# Patient Record
Sex: MALE | Race: OTHER | HISPANIC OR LATINO | URBAN - METROPOLITAN AREA
[De-identification: names, ages, dates, MRNs, and addresses within clinical notes are randomized per-mention and may not be internally consistent; named-entity substitution may affect disease eponyms.]

---

## 2023-03-05 ENCOUNTER — EMERGENCY (EMERGENCY)
Facility: HOSPITAL | Age: 68
LOS: 1 days | Discharge: HOME | End: 2023-03-05
Admitting: EMERGENCY MEDICINE
Payer: MEDICARE

## 2023-03-05 VITALS
OXYGEN SATURATION: 97 % | HEART RATE: 85 BPM | HEIGHT: 66 IN | DIASTOLIC BLOOD PRESSURE: 74 MMHG | RESPIRATION RATE: 18 BRPM | SYSTOLIC BLOOD PRESSURE: 165 MMHG | TEMPERATURE: 98 F | WEIGHT: 160.06 LBS

## 2023-03-05 PROCEDURE — 96372 THER/PROPH/DIAG INJ SC/IM: CPT

## 2023-03-05 PROCEDURE — 99284 EMERGENCY DEPT VISIT MOD MDM: CPT

## 2023-03-05 PROCEDURE — 99283 EMERGENCY DEPT VISIT LOW MDM: CPT

## 2023-03-05 RX ORDER — DICLOFENAC SODIUM 75 MG/1
1 TABLET, DELAYED RELEASE ORAL
Qty: 15 | Refills: 0
Start: 2023-03-05 | End: 2023-03-09

## 2023-03-05 RX ORDER — LIDOCAINE 4 G/100G
1 CREAM TOPICAL ONCE
Refills: 0 | Status: COMPLETED | OUTPATIENT
Start: 2023-03-05 | End: 2023-03-05

## 2023-03-05 RX ORDER — CYCLOBENZAPRINE HYDROCHLORIDE 10 MG/1
10 TABLET, FILM COATED ORAL ONCE
Refills: 0 | Status: COMPLETED | OUTPATIENT
Start: 2023-03-05 | End: 2023-03-05

## 2023-03-05 RX ORDER — LIDOCAINE 4 G/100G
1 CREAM TOPICAL
Qty: 7 | Refills: 0
Start: 2023-03-05 | End: 2023-03-11

## 2023-03-05 RX ORDER — CYCLOBENZAPRINE HYDROCHLORIDE 10 MG/1
1 TABLET, FILM COATED ORAL
Qty: 15 | Refills: 0
Start: 2023-03-05 | End: 2023-03-09

## 2023-03-05 RX ORDER — KETOROLAC TROMETHAMINE 30 MG/ML
30 SYRINGE (ML) INJECTION ONCE
Refills: 0 | Status: DISCONTINUED | OUTPATIENT
Start: 2023-03-05 | End: 2023-03-05

## 2023-03-05 RX ADMIN — CYCLOBENZAPRINE HYDROCHLORIDE 10 MILLIGRAM(S): 10 TABLET, FILM COATED ORAL at 09:34

## 2023-03-05 RX ADMIN — LIDOCAINE 1 PATCH: 4 CREAM TOPICAL at 09:33

## 2023-03-05 RX ADMIN — Medication 30 MILLIGRAM(S): at 09:33

## 2023-03-05 NOTE — ED ADULT TRIAGE NOTE - CHIEF COMPLAINT QUOTE
pt  [place], c/o right lower back pain, radiating to the right buttocks area and RLE x 1 month. pt stated " its like a pinch that doesn't go away" took  ibuprofen at 5 am with no relief.

## 2023-03-05 NOTE — ED PROVIDER NOTE - NSFOLLOWUPINSTRUCTIONS_ED_ALL_ED_FT
Sciatica  Sciatica is pain, weakness, tingling, or loss of feeling (numbness) along the sciatic nerve. The sciatic nerve starts in the lower back and goes down the back of each leg. Sciatica usually goes away on its own or with treatment. Sometimes, sciatica may come back (recur).  What are the causes?  This condition happens when the sciatic nerve is pinched or has pressure put on it. This may be the result of:  •A disk in between the bones of the spine bulging out too far (herniated disk).  •Changes in the spinal disks that occur with aging.  •A condition that affects a muscle in the butt.  •Extra bone growth near the sciatic nerve.  •A break (fracture) of the area between your hip bones (pelvis).  •Pregnancy.   •Tumor. This is rare.  What increases the risk?  You are more likely to develop this condition if you:  •Play sports that put pressure or stress on the spine.  •Have poor strength and ease of movement (flexibility).  •Have had a back injury in the past.  •Have had back surgery.  •Sit for long periods of time.  •Do activities that involve bending or lifting over and over again.  •Are very overweight (obese).  What are the signs or symptoms?  Symptoms can vary from mild to very bad. They may include:•Any of these problems in the lower back, leg, hip, or butt:  •Mild tingling, loss of feeling, or dull aches.  •Burning sensations.  •Sharp pains.   •Loss of feeling in the back of the calf or the sole of the foot.  •Leg weakness.   •Very bad back pain that makes it hard to move.  These symptoms may get worse when you cough, sneeze, or laugh. They may also get worse when you sit or stand for long periods of time.  How is this treated?  This condition often gets better without any treatment. However, treatment may include:  •Changing or cutting back on physical activity when you have pain.  •Doing exercises and stretching.  •Putting ice or heat on the affected area.  •Medicines that help:   •To relieve pain and swelling.  •To relax your muscles.   •Shots (injections) of medicines that help to relieve pain, irritation, and swelling.  •Surgery.  Follow these instructions at home:  Medicines   •Take over-the-counter and prescription medicines only as told by your doctor.  •Ask your doctor if the medicine prescribed to you:  •Requires you to avoid driving or using heavy machinery.  •Can cause trouble pooping (constipation). You may need to take these steps to prevent or treat trouble pooping:  •Drink enough fluids to keep your pee (urine) pale yellow.  •Take over-the-counter or prescription medicines.  •Eat foods that are high in fiber. These include beans, whole grains, and fresh fruits and vegetables.  •Limit foods that are high in fat and sugar. These include fried or sweet foods.  Managing pain   •If told, put ice on the affected area.  •Put ice in a plastic bag.  •Place a towel between your skin and the bag.  •Leave the ice on for 20 minutes, 2–3 times a day.  •If told, put heat on the affected area. Use the heat source that your doctor tells you to use, such as a moist heat pack or a heating pad.  •Place a towel between your skin and the heat source.  •Leave the heat on for 20–30 minutes.  •Remove the heat if your skin turns bright red. This is very important if you are unable to feel pain, heat, or cold. You may have a greater risk of getting burned.  Activity   •Return to your normal activities as told by your doctor. Ask your doctor what activities are safe for you.  •Avoid activities that make your symptoms worse.  •Take short rests during the day.  •When you rest for a long time, do some physical activity or stretching between periods of rest.  •Avoid sitting for a long time without moving. Get up and move around at least one time each hour.  •Exercise and stretch regularly, as told by your doctor.  • Do not lift anything that is heavier than 10 lb (4.5 kg) while you have symptoms of sciatica.  •Avoid lifting heavy things even when you do not have symptoms.  •Avoid lifting heavy things over and over.  •When you lift objects, always lift in a way that is safe for your body. To do this, you should:  •Bend your knees.  •Keep the object close to your body.  •Avoid twisting.  General instructions   •Stay at a healthy weight.  •Wear comfortable shoes that support your feet. Avoid wearing high heels.  •Avoid sleeping on a mattress that is too soft or too hard. You might have less pain if you sleep on a mattress that is firm enough to support your back.  •Keep all follow-up visits as told by your doctor. This is important.  Contact a doctor if:  •You have pain that:  •Wakes you up when you are sleeping.  •Gets worse when you lie down.  •Is worse than the pain you have had in the past.  •Lasts longer than 4 weeks.  •You lose weight without trying.  Get help right away if:  •You cannot control when you pee (urinate) or poop (have a bowel movement).  •You have weakness in any of these areas and it gets worse:  •Lower back.  •The area between your hip bones.  •Butt.  •Legs.  •You have redness or swelling of your back.  •You have a burning feeling when you pee.  Summary  •Sciatica is pain, weakness, tingling, or loss of feeling (numbness) along the sciatic nerve.  •This condition happens when the sciatic nerve is pinched or has pressure put on it.  •Sciatica can cause pain, tingling, or loss of feeling (numbness) in the lower back, legs, hips, and butt.  •Treatment often includes rest, exercise, medicines, and putting ice or heat on the affected area.

## 2023-03-05 NOTE — ED ADULT NURSE NOTE - CHIEF COMPLAINT QUOTE
pt  [place], c/o right lower back pain, radiating to the right buttocks area and RLE x 1 month. pt stated " its like a pinch that doesn't go away" took  ibuprofen at 5 am with no relief.

## 2023-03-05 NOTE — ED ADULT NURSE NOTE - OBJECTIVE STATEMENT
Patient to ED c/o sciatic pain that starts in right buttock and radiates down right leg x 1 month, denies recent trauma/injury. Ambulatory, AAOX4, NAD.

## 2023-03-05 NOTE — ED PROVIDER NOTE - PATIENT PORTAL LINK FT
You can access the FollowMyHealth Patient Portal offered by [facility] by registering at the following website: [email or web address]. By joining [facility]’s FollowMyHealth portal, you will also be able to view your health information using other applications (apps) compatible with our system.

## 2023-03-05 NOTE — ED PROVIDER NOTE - CLINICAL SUMMARY MEDICAL DECISION MAKING FREE TEXT BOX
pt c/o r buttock pain, radiating down r thigh x 1 mon, no acute changes in symptoms today, no falls/trauma - no concern for fx, no concern for cord compression or cauda equina, exam consistent w/sciatica, will tx w/nsaids and muscle relaxants plus lidoderm, to rest and avoid heavy lifting, etc. f/u w/spine for eval and further management, no emergent indication for any imaging or labs at this time, pt understands and agrees w/plan, strict return precautions given

## 2023-03-05 NOTE — ED PROVIDER NOTE - OBJECTIVE STATEMENT
The pt is a 66 y/o M, who presents to ED c/o R buttock pain x 1 mon. Pt states pain starts in R buttock, radiates down R thigh, constant and dull, aggravated w/certain mov and positions, pain is 7/10, took ibuprofen w/o relief - no meds taken today. Denies fall, trauma, numbness or tingling to toes, loss of bowel or bladder control, fevers, chills, n/v/d, abd pain, flank pain.

## 2023-03-05 NOTE — ED PROVIDER NOTE - MUSCULOSKELETAL, MLM
no spinal tend, no discoloration, FROM, + discomfort over R sciatic notch, LE w/FROM b/l, muscle strength 5/5 b/l, good resistance b/l, + straight leg raise on R, normal gait

## 2023-03-07 DIAGNOSIS — M79.18 MYALGIA, OTHER SITE: ICD-10-CM

## 2023-03-07 DIAGNOSIS — M54.31 SCIATICA, RIGHT SIDE: ICD-10-CM

## 2024-09-23 ENCOUNTER — INPATIENT (INPATIENT)
Facility: HOSPITAL | Age: 69
LOS: 4 days | Discharge: HOME | DRG: 377 | End: 2024-09-28
Attending: STUDENT IN AN ORGANIZED HEALTH CARE EDUCATION/TRAINING PROGRAM | Admitting: STUDENT IN AN ORGANIZED HEALTH CARE EDUCATION/TRAINING PROGRAM
Payer: MEDICARE

## 2024-09-23 VITALS
RESPIRATION RATE: 16 BRPM | SYSTOLIC BLOOD PRESSURE: 172 MMHG | WEIGHT: 149.91 LBS | TEMPERATURE: 98 F | HEIGHT: 66 IN | OXYGEN SATURATION: 99 % | HEART RATE: 79 BPM | DIASTOLIC BLOOD PRESSURE: 57 MMHG

## 2024-09-23 DIAGNOSIS — Z29.9 ENCOUNTER FOR PROPHYLACTIC MEASURES, UNSPECIFIED: ICD-10-CM

## 2024-09-23 DIAGNOSIS — D64.9 ANEMIA, UNSPECIFIED: ICD-10-CM

## 2024-09-23 DIAGNOSIS — K83.8 OTHER SPECIFIED DISEASES OF BILIARY TRACT: ICD-10-CM

## 2024-09-23 DIAGNOSIS — J96.01 ACUTE RESPIRATORY FAILURE WITH HYPOXIA: ICD-10-CM

## 2024-09-23 DIAGNOSIS — E11.9 TYPE 2 DIABETES MELLITUS WITHOUT COMPLICATIONS: ICD-10-CM

## 2024-09-23 DIAGNOSIS — F11.20 OPIOID DEPENDENCE, UNCOMPLICATED: ICD-10-CM

## 2024-09-23 DIAGNOSIS — I10 ESSENTIAL (PRIMARY) HYPERTENSION: ICD-10-CM

## 2024-09-23 DIAGNOSIS — I50.9 HEART FAILURE, UNSPECIFIED: ICD-10-CM

## 2024-09-23 DIAGNOSIS — N17.9 ACUTE KIDNEY FAILURE, UNSPECIFIED: ICD-10-CM

## 2024-09-23 LAB
ADD ON TEST-SPECIMEN IN LAB: SIGNIFICANT CHANGE UP
AGGLUTINATION: PRESENT — SIGNIFICANT CHANGE UP
ALBUMIN SERPL ELPH-MCNC: 3.9 G/DL — SIGNIFICANT CHANGE UP (ref 3.3–5)
ALP SERPL-CCNC: 93 U/L — SIGNIFICANT CHANGE UP (ref 40–120)
ALT FLD-CCNC: 9 U/L — LOW (ref 10–45)
ANION GAP SERPL CALC-SCNC: 11 MMOL/L — SIGNIFICANT CHANGE UP (ref 5–17)
ANISOCYTOSIS BLD QL: SIGNIFICANT CHANGE UP
AST SERPL-CCNC: 12 U/L — SIGNIFICANT CHANGE UP (ref 10–40)
BASOPHILS # BLD AUTO: 0.08 K/UL — SIGNIFICANT CHANGE UP (ref 0–0.2)
BASOPHILS NFR BLD AUTO: 0.9 % — SIGNIFICANT CHANGE UP (ref 0–2)
BILIRUB SERPL-MCNC: <0.2 MG/DL — SIGNIFICANT CHANGE UP (ref 0.2–1.2)
BLD GP AB SCN SERPL QL: NEGATIVE — SIGNIFICANT CHANGE UP
BUN SERPL-MCNC: 30 MG/DL — HIGH (ref 7–23)
CALCIUM SERPL-MCNC: 9 MG/DL — SIGNIFICANT CHANGE UP (ref 8.4–10.5)
CHLORIDE SERPL-SCNC: 104 MMOL/L — SIGNIFICANT CHANGE UP (ref 96–108)
CO2 SERPL-SCNC: 23 MMOL/L — SIGNIFICANT CHANGE UP (ref 22–31)
CREAT SERPL-MCNC: 1.4 MG/DL — HIGH (ref 0.5–1.3)
DACRYOCYTES BLD QL SMEAR: SLIGHT — SIGNIFICANT CHANGE UP
EGFR: 55 ML/MIN/1.73M2 — LOW
EGFR: 55 ML/MIN/1.73M2 — LOW
EOSINOPHIL # BLD AUTO: 0 K/UL — SIGNIFICANT CHANGE UP (ref 0–0.5)
EOSINOPHIL NFR BLD AUTO: 0 % — SIGNIFICANT CHANGE UP (ref 0–6)
FERRITIN SERPL-MCNC: 8 NG/ML — LOW (ref 30–400)
GIANT PLATELETS BLD QL SMEAR: PRESENT — SIGNIFICANT CHANGE UP
GLUCOSE BLDC GLUCOMTR-MCNC: 193 MG/DL — HIGH (ref 70–99)
GLUCOSE SERPL-MCNC: 160 MG/DL — HIGH (ref 70–99)
HAPTOGLOB SERPL-MCNC: 182 MG/DL — SIGNIFICANT CHANGE UP (ref 34–200)
HCT VFR BLD CALC: 14.1 % — CRITICAL LOW (ref 39–50)
HCT VFR BLD CALC: 21.4 % — LOW (ref 39–50)
HGB BLD-MCNC: 4 G/DL — CRITICAL LOW (ref 13–17)
HGB BLD-MCNC: 6.4 G/DL — CRITICAL LOW (ref 13–17)
HYPOCHROMIA BLD QL: SIGNIFICANT CHANGE UP
IRON SATN MFR SERPL: 2 % — LOW (ref 16–55)
IRON SATN MFR SERPL: 9 UG/DL — LOW (ref 45–165)
LDH SERPL L TO P-CCNC: 272 U/L — HIGH (ref 50–242)
LYMPHOCYTES # BLD AUTO: 0.83 K/UL — LOW (ref 1–3.3)
LYMPHOCYTES # BLD AUTO: 9.5 % — LOW (ref 13–44)
MACROCYTES BLD QL: SLIGHT — SIGNIFICANT CHANGE UP
MANUAL SMEAR VERIFICATION: SIGNIFICANT CHANGE UP
MCHC RBC-ENTMCNC: 20.6 PG — LOW (ref 27–34)
MCHC RBC-ENTMCNC: 22.7 PG — LOW (ref 27–34)
MCHC RBC-ENTMCNC: 28.4 GM/DL — LOW (ref 32–36)
MCHC RBC-ENTMCNC: 29.9 GM/DL — LOW (ref 32–36)
MCV RBC AUTO: 72.7 FL — LOW (ref 80–100)
MCV RBC AUTO: 75.9 FL — LOW (ref 80–100)
MICROCYTES BLD QL: SIGNIFICANT CHANGE UP
MONOCYTES # BLD AUTO: 0.45 K/UL — SIGNIFICANT CHANGE UP (ref 0–0.9)
MONOCYTES NFR BLD AUTO: 5.2 % — SIGNIFICANT CHANGE UP (ref 2–14)
NEUTROPHILS # BLD AUTO: 7.35 K/UL — SIGNIFICANT CHANGE UP (ref 1.8–7.4)
NEUTROPHILS NFR BLD AUTO: 83.5 % — HIGH (ref 43–77)
NEUTS BAND # BLD: 0.9 % — SIGNIFICANT CHANGE UP (ref 0–8)
NEUTS BAND NFR BLD: 0.9 % — SIGNIFICANT CHANGE UP (ref 0–8)
NRBC # BLD: 0 /100 WBCS — SIGNIFICANT CHANGE UP (ref 0–0)
NRBC BLD-RTO: 0 /100 WBCS — SIGNIFICANT CHANGE UP (ref 0–0)
OB PNL STL: POSITIVE
OVALOCYTES BLD QL SMEAR: SLIGHT — SIGNIFICANT CHANGE UP
PLAT MORPH BLD: ABNORMAL
PLATELET # BLD AUTO: 171 K/UL — SIGNIFICANT CHANGE UP (ref 150–400)
PLATELET # BLD AUTO: 181 K/UL — SIGNIFICANT CHANGE UP (ref 150–400)
POIKILOCYTOSIS BLD QL AUTO: SIGNIFICANT CHANGE UP
POLYCHROMASIA BLD QL SMEAR: SLIGHT — SIGNIFICANT CHANGE UP
POTASSIUM SERPL-MCNC: 4.8 MMOL/L — SIGNIFICANT CHANGE UP (ref 3.5–5.3)
POTASSIUM SERPL-SCNC: 4.8 MMOL/L — SIGNIFICANT CHANGE UP (ref 3.5–5.3)
PROT SERPL-MCNC: 7.5 G/DL — SIGNIFICANT CHANGE UP (ref 6–8.3)
RBC # BLD: 1.94 M/UL — LOW (ref 4.2–5.8)
RBC # BLD: 2.82 M/UL — LOW (ref 4.2–5.8)
RBC # FLD: 15.6 % — HIGH (ref 10.3–14.5)
RBC # FLD: 15.9 % — HIGH (ref 10.3–14.5)
RBC BLD AUTO: ABNORMAL
RH IG SCN BLD-IMP: POSITIVE — SIGNIFICANT CHANGE UP
RH IG SCN BLD-IMP: POSITIVE — SIGNIFICANT CHANGE UP
SCHISTOCYTES BLD QL AUTO: SLIGHT — SIGNIFICANT CHANGE UP
SODIUM SERPL-SCNC: 138 MMOL/L — SIGNIFICANT CHANGE UP (ref 135–145)
SPHEROCYTES BLD QL SMEAR: SLIGHT — SIGNIFICANT CHANGE UP
TARGETS BLD QL SMEAR: SLIGHT — SIGNIFICANT CHANGE UP
TIBC SERPL-MCNC: 427 UG/DL — SIGNIFICANT CHANGE UP (ref 220–430)
TRANSFERRIN SERPL-MCNC: 339 MG/DL — SIGNIFICANT CHANGE UP (ref 200–360)
UIBC SERPL-MCNC: 418 UG/DL — HIGH (ref 110–370)
WBC # BLD: 11.44 K/UL — HIGH (ref 3.8–10.5)
WBC # BLD: 8.71 K/UL — SIGNIFICANT CHANGE UP (ref 3.8–10.5)
WBC # FLD AUTO: 11.44 K/UL — HIGH (ref 3.8–10.5)
WBC # FLD AUTO: 8.71 K/UL — SIGNIFICANT CHANGE UP (ref 3.8–10.5)

## 2024-09-23 PROCEDURE — 99285 EMERGENCY DEPT VISIT HI MDM: CPT

## 2024-09-23 PROCEDURE — 99221 1ST HOSP IP/OBS SF/LOW 40: CPT

## 2024-09-23 PROCEDURE — 71275 CT ANGIOGRAPHY CHEST: CPT | Mod: 26,MC

## 2024-09-23 PROCEDURE — 71045 X-RAY EXAM CHEST 1 VIEW: CPT | Mod: 26

## 2024-09-23 PROCEDURE — 99223 1ST HOSP IP/OBS HIGH 75: CPT | Mod: GC

## 2024-09-23 PROCEDURE — 74174 CTA ABD&PLVS W/CONTRAST: CPT | Mod: 26,MC

## 2024-09-23 RX ORDER — DEXTROSE 50 % IN WATER 50 %
25 SYRINGE (ML) INTRAVENOUS ONCE
Refills: 0 | Status: DISCONTINUED | OUTPATIENT
Start: 2024-09-23 | End: 2024-09-28

## 2024-09-23 RX ORDER — DEXTROSE 50 % IN WATER 50 %
15 SYRINGE (ML) INTRAVENOUS ONCE
Refills: 0 | Status: DISCONTINUED | OUTPATIENT
Start: 2024-09-23 | End: 2024-09-28

## 2024-09-23 RX ORDER — DEXTROSE 50 % IN WATER 50 %
12.5 SYRINGE (ML) INTRAVENOUS ONCE
Refills: 0 | Status: DISCONTINUED | OUTPATIENT
Start: 2024-09-23 | End: 2024-09-28

## 2024-09-23 RX ORDER — INSULIN LISPRO 100 U/ML
INJECTION, SOLUTION INTRAVENOUS; SUBCUTANEOUS
Refills: 0 | Status: DISCONTINUED | OUTPATIENT
Start: 2024-09-23 | End: 2024-09-28

## 2024-09-23 RX ORDER — FUROSEMIDE 10 MG/ML
20 INJECTION INTRAMUSCULAR; INTRAVENOUS ONCE
Refills: 0 | Status: DISCONTINUED | OUTPATIENT
Start: 2024-09-23 | End: 2024-09-23

## 2024-09-23 RX ORDER — IPRATROPIUM BROMIDE AND ALBUTEROL SULFATE .5; 2.5 MG/3ML; MG/3ML
3 SOLUTION RESPIRATORY (INHALATION) EVERY 6 HOURS
Refills: 0 | Status: DISCONTINUED | OUTPATIENT
Start: 2024-09-23 | End: 2024-09-28

## 2024-09-23 RX ORDER — INFLUENZA A VIRUS A/IDAHO/07/2018 (H1N1) ANTIGEN (MDCK CELL DERIVED, PROPIOLACTONE INACTIVATED, INFLUENZA A VIRUS A/INDIANA/08/2018 (H3N2) ANTIGEN (MDCK CELL DERIVED, PROPIOLACTONE INACTIVATED), INFLUENZA B VIRUS B/SINGAPORE/INFTT-16-0610/2016 ANTIGEN (MDCK CELL DERIVED, PROPIOLACTONE INACTIVATED), INFLUENZA B VIRUS B/IOWA/06/2017 ANTIGEN (MDCK CELL DERIVED, PROPIOLACTONE INACTIVATED) 15; 15; 15; 15 UG/.5ML; UG/.5ML; UG/.5ML; UG/.5ML
0.5 INJECTION, SUSPENSION INTRAMUSCULAR ONCE
Refills: 0 | Status: DISCONTINUED | OUTPATIENT
Start: 2024-09-23 | End: 2024-09-28

## 2024-09-23 RX ORDER — SODIUM CHLORIDE 9 G/1000ML
1000 INJECTION, SOLUTION INTRAVENOUS
Refills: 0 | Status: DISCONTINUED | OUTPATIENT
Start: 2024-09-23 | End: 2024-09-28

## 2024-09-23 RX ORDER — FUROSEMIDE 10 MG/ML
20 INJECTION INTRAMUSCULAR; INTRAVENOUS ONCE
Refills: 0 | Status: COMPLETED | OUTPATIENT
Start: 2024-09-23 | End: 2024-09-23

## 2024-09-23 RX ORDER — GLUCAGON 3 MG/1
1 POWDER NASAL ONCE
Refills: 0 | Status: DISCONTINUED | OUTPATIENT
Start: 2024-09-23 | End: 2024-09-28

## 2024-09-23 RX ORDER — ALBUTEROL SULFATE 2.5 MG/3ML
1 VIAL, NEBULIZER (ML) INHALATION EVERY 6 HOURS
Refills: 0 | Status: DISCONTINUED | OUTPATIENT
Start: 2024-09-23 | End: 2024-09-28

## 2024-09-23 RX ORDER — LISINOPRIL 5 MG/1
20 TABLET ORAL EVERY 24 HOURS
Refills: 0 | Status: DISCONTINUED | OUTPATIENT
Start: 2024-09-23 | End: 2024-09-25

## 2024-09-23 RX ORDER — FUROSEMIDE 10 MG/ML
20 INJECTION INTRAMUSCULAR; INTRAVENOUS ONCE
Refills: 0 | Status: COMPLETED | OUTPATIENT
Start: 2024-09-24 | End: 2024-09-24

## 2024-09-23 RX ADMIN — Medication 40 MILLIGRAM(S): at 11:56

## 2024-09-23 RX ADMIN — FUROSEMIDE 20 MILLIGRAM(S): 10 INJECTION INTRAMUSCULAR; INTRAVENOUS at 18:29

## 2024-09-23 RX ADMIN — IPRATROPIUM BROMIDE AND ALBUTEROL SULFATE 3 MILLILITER(S): .5; 2.5 SOLUTION RESPIRATORY (INHALATION) at 22:30

## 2024-09-23 RX ADMIN — LISINOPRIL 20 MILLIGRAM(S): 5 TABLET ORAL at 22:29

## 2024-09-23 RX ADMIN — INSULIN LISPRO 1: 100 INJECTION, SOLUTION INTRAVENOUS; SUBCUTANEOUS at 22:29

## 2024-09-24 ENCOUNTER — TRANSCRIPTION ENCOUNTER (OUTPATIENT)
Age: 69
End: 2024-09-24

## 2024-09-24 ENCOUNTER — RESULT REVIEW (OUTPATIENT)
Age: 69
End: 2024-09-24

## 2024-09-24 LAB
A1C WITH ESTIMATED AVERAGE GLUCOSE RESULT: 6.9 % — HIGH (ref 4–5.6)
ALBUMIN SERPL ELPH-MCNC: 4 G/DL — SIGNIFICANT CHANGE UP (ref 3.3–5)
ALP SERPL-CCNC: 100 U/L — SIGNIFICANT CHANGE UP (ref 40–120)
ALT FLD-CCNC: 8 U/L — LOW (ref 10–45)
ANION GAP SERPL CALC-SCNC: 12 MMOL/L — SIGNIFICANT CHANGE UP (ref 5–17)
AST SERPL-CCNC: 12 U/L — SIGNIFICANT CHANGE UP (ref 10–40)
BASOPHILS # BLD AUTO: 0.05 K/UL — SIGNIFICANT CHANGE UP (ref 0–0.2)
BASOPHILS NFR BLD AUTO: 0.5 % — SIGNIFICANT CHANGE UP (ref 0–2)
BILIRUB SERPL-MCNC: 0.8 MG/DL — SIGNIFICANT CHANGE UP (ref 0.2–1.2)
BUN SERPL-MCNC: 24 MG/DL — HIGH (ref 7–23)
CALCIUM SERPL-MCNC: 9.2 MG/DL — SIGNIFICANT CHANGE UP (ref 8.4–10.5)
CHLORIDE SERPL-SCNC: 102 MMOL/L — SIGNIFICANT CHANGE UP (ref 96–108)
CO2 SERPL-SCNC: 25 MMOL/L — SIGNIFICANT CHANGE UP (ref 22–31)
CREAT SERPL-MCNC: 1.47 MG/DL — HIGH (ref 0.5–1.3)
EGFR: 52 ML/MIN/1.73M2 — LOW
EGFR: 52 ML/MIN/1.73M2 — LOW
EOSINOPHIL # BLD AUTO: 0.14 K/UL — SIGNIFICANT CHANGE UP (ref 0–0.5)
EOSINOPHIL NFR BLD AUTO: 1.5 % — SIGNIFICANT CHANGE UP (ref 0–6)
ESTIMATED AVERAGE GLUCOSE: 151 MG/DL — HIGH (ref 68–114)
GLUCOSE BLDC GLUCOMTR-MCNC: 145 MG/DL — HIGH (ref 70–99)
GLUCOSE BLDC GLUCOMTR-MCNC: 196 MG/DL — HIGH (ref 70–99)
GLUCOSE BLDC GLUCOMTR-MCNC: 218 MG/DL — HIGH (ref 70–99)
GLUCOSE BLDC GLUCOMTR-MCNC: 254 MG/DL — HIGH (ref 70–99)
GLUCOSE BLDC GLUCOMTR-MCNC: 284 MG/DL — HIGH (ref 70–99)
GLUCOSE BLDC GLUCOMTR-MCNC: 313 MG/DL — HIGH (ref 70–99)
GLUCOSE SERPL-MCNC: 136 MG/DL — HIGH (ref 70–99)
HCT VFR BLD CALC: 24.5 % — LOW (ref 39–50)
HCV AB S/CO SERPL IA: 45.35 S/CO — HIGH
HCV AB SERPL-IMP: REACTIVE
HGB BLD-MCNC: 7.6 G/DL — LOW (ref 13–17)
IMM GRANULOCYTES NFR BLD AUTO: 0.7 % — SIGNIFICANT CHANGE UP (ref 0–0.9)
LYMPHOCYTES # BLD AUTO: 1.29 K/UL — SIGNIFICANT CHANGE UP (ref 1–3.3)
LYMPHOCYTES # BLD AUTO: 14 % — SIGNIFICANT CHANGE UP (ref 13–44)
MAGNESIUM SERPL-MCNC: 2.1 MG/DL — SIGNIFICANT CHANGE UP (ref 1.6–2.6)
MCHC RBC-ENTMCNC: 24 PG — LOW (ref 27–34)
MCHC RBC-ENTMCNC: 31 GM/DL — LOW (ref 32–36)
MCV RBC AUTO: 77.3 FL — LOW (ref 80–100)
MONOCYTES # BLD AUTO: 0.98 K/UL — HIGH (ref 0–0.9)
MONOCYTES NFR BLD AUTO: 10.7 % — SIGNIFICANT CHANGE UP (ref 2–14)
NEUTROPHILS # BLD AUTO: 6.68 K/UL — SIGNIFICANT CHANGE UP (ref 1.8–7.4)
NEUTROPHILS NFR BLD AUTO: 72.6 % — SIGNIFICANT CHANGE UP (ref 43–77)
NRBC # BLD: 0 /100 WBCS — SIGNIFICANT CHANGE UP (ref 0–0)
NRBC BLD-RTO: 0 /100 WBCS — SIGNIFICANT CHANGE UP (ref 0–0)
PHOSPHATE SERPL-MCNC: 3.7 MG/DL — SIGNIFICANT CHANGE UP (ref 2.5–4.5)
PLATELET # BLD AUTO: 172 K/UL — SIGNIFICANT CHANGE UP (ref 150–400)
POTASSIUM SERPL-MCNC: 4.2 MMOL/L — SIGNIFICANT CHANGE UP (ref 3.5–5.3)
POTASSIUM SERPL-SCNC: 4.2 MMOL/L — SIGNIFICANT CHANGE UP (ref 3.5–5.3)
PROT SERPL-MCNC: 8 G/DL — SIGNIFICANT CHANGE UP (ref 6–8.3)
RBC # BLD: 3.17 M/UL — LOW (ref 4.2–5.8)
RBC # FLD: 16.5 % — HIGH (ref 10.3–14.5)
SODIUM SERPL-SCNC: 139 MMOL/L — SIGNIFICANT CHANGE UP (ref 135–145)
WBC # BLD: 9.2 K/UL — SIGNIFICANT CHANGE UP (ref 3.8–10.5)
WBC # FLD AUTO: 9.2 K/UL — SIGNIFICANT CHANGE UP (ref 3.8–10.5)

## 2024-09-24 PROCEDURE — 99233 SBSQ HOSP IP/OBS HIGH 50: CPT | Mod: GC

## 2024-09-24 PROCEDURE — 93306 TTE W/DOPPLER COMPLETE: CPT | Mod: 26

## 2024-09-24 PROCEDURE — 99232 SBSQ HOSP IP/OBS MODERATE 35: CPT

## 2024-09-24 RX ORDER — ACETAMINOPHEN 500 MG/5ML
650 LIQUID (ML) ORAL EVERY 6 HOURS
Refills: 0 | Status: DISCONTINUED | OUTPATIENT
Start: 2024-09-24 | End: 2024-09-28

## 2024-09-24 RX ORDER — METHADONE HCL 10 MG
55 TABLET ORAL DAILY
Refills: 0 | Status: DISCONTINUED | OUTPATIENT
Start: 2024-09-24 | End: 2024-09-24

## 2024-09-24 RX ORDER — NICOTINE POLACRILEX 4 MG/1
1 GUM, CHEWING ORAL DAILY
Refills: 0 | Status: DISCONTINUED | OUTPATIENT
Start: 2024-09-24 | End: 2024-09-28

## 2024-09-24 RX ORDER — METHADONE HCL 10 MG
55 TABLET ORAL EVERY 24 HOURS
Refills: 0 | Status: DISCONTINUED | OUTPATIENT
Start: 2024-09-24 | End: 2024-09-28

## 2024-09-24 RX ADMIN — INSULIN LISPRO 3: 100 INJECTION, SOLUTION INTRAVENOUS; SUBCUTANEOUS at 10:37

## 2024-09-24 RX ADMIN — IPRATROPIUM BROMIDE AND ALBUTEROL SULFATE 3 MILLILITER(S): .5; 2.5 SOLUTION RESPIRATORY (INHALATION) at 05:18

## 2024-09-24 RX ADMIN — LISINOPRIL 20 MILLIGRAM(S): 5 TABLET ORAL at 21:52

## 2024-09-24 RX ADMIN — IPRATROPIUM BROMIDE AND ALBUTEROL SULFATE 3 MILLILITER(S): .5; 2.5 SOLUTION RESPIRATORY (INHALATION) at 16:00

## 2024-09-24 RX ADMIN — Medication 40 MILLIGRAM(S): at 23:45

## 2024-09-24 RX ADMIN — Medication 40 MILLIGRAM(S): at 11:58

## 2024-09-24 RX ADMIN — INSULIN LISPRO 4: 100 INJECTION, SOLUTION INTRAVENOUS; SUBCUTANEOUS at 14:02

## 2024-09-24 RX ADMIN — INSULIN LISPRO 2: 100 INJECTION, SOLUTION INTRAVENOUS; SUBCUTANEOUS at 22:47

## 2024-09-24 RX ADMIN — Medication 40 MILLIGRAM(S): at 00:01

## 2024-09-24 RX ADMIN — Medication 650 MILLIGRAM(S): at 23:45

## 2024-09-24 RX ADMIN — IPRATROPIUM BROMIDE AND ALBUTEROL SULFATE 3 MILLILITER(S): .5; 2.5 SOLUTION RESPIRATORY (INHALATION) at 10:39

## 2024-09-24 RX ADMIN — NICOTINE POLACRILEX 1 PATCH: 4 GUM, CHEWING ORAL at 11:58

## 2024-09-24 RX ADMIN — FUROSEMIDE 20 MILLIGRAM(S): 10 INJECTION INTRAMUSCULAR; INTRAVENOUS at 05:19

## 2024-09-24 RX ADMIN — IPRATROPIUM BROMIDE AND ALBUTEROL SULFATE 3 MILLILITER(S): .5; 2.5 SOLUTION RESPIRATORY (INHALATION) at 22:47

## 2024-09-24 RX ADMIN — Medication 650 MILLIGRAM(S): at 22:47

## 2024-09-24 RX ADMIN — NICOTINE POLACRILEX 1 PATCH: 4 GUM, CHEWING ORAL at 18:43

## 2024-09-24 RX ADMIN — Medication 55 MILLIGRAM(S): at 19:33

## 2024-09-25 LAB
ANION GAP SERPL CALC-SCNC: 10 MMOL/L — SIGNIFICANT CHANGE UP (ref 5–17)
BASOPHILS # BLD AUTO: 0.05 K/UL — SIGNIFICANT CHANGE UP (ref 0–0.2)
BASOPHILS NFR BLD AUTO: 0.6 % — SIGNIFICANT CHANGE UP (ref 0–2)
BLD GP AB SCN SERPL QL: NEGATIVE — SIGNIFICANT CHANGE UP
BUN SERPL-MCNC: 14 MG/DL — SIGNIFICANT CHANGE UP (ref 7–23)
CALCIUM SERPL-MCNC: 9.2 MG/DL — SIGNIFICANT CHANGE UP (ref 8.4–10.5)
CHLORIDE SERPL-SCNC: 102 MMOL/L — SIGNIFICANT CHANGE UP (ref 96–108)
CO2 SERPL-SCNC: 26 MMOL/L — SIGNIFICANT CHANGE UP (ref 22–31)
CREAT ?TM UR-MCNC: 70 MG/DL — SIGNIFICANT CHANGE UP
CREAT SERPL-MCNC: 1.15 MG/DL — SIGNIFICANT CHANGE UP (ref 0.5–1.3)
EGFR: 70 ML/MIN/1.73M2 — SIGNIFICANT CHANGE UP
EGFR: 70 ML/MIN/1.73M2 — SIGNIFICANT CHANGE UP
EOSINOPHIL # BLD AUTO: 0.11 K/UL — SIGNIFICANT CHANGE UP (ref 0–0.5)
EOSINOPHIL NFR BLD AUTO: 1.2 % — SIGNIFICANT CHANGE UP (ref 0–6)
FOLATE SERPL-MCNC: 6.9 NG/ML — SIGNIFICANT CHANGE UP
GLUCOSE BLDC GLUCOMTR-MCNC: 147 MG/DL — HIGH (ref 70–99)
GLUCOSE BLDC GLUCOMTR-MCNC: 183 MG/DL — HIGH (ref 70–99)
GLUCOSE BLDC GLUCOMTR-MCNC: 217 MG/DL — HIGH (ref 70–99)
GLUCOSE BLDC GLUCOMTR-MCNC: 239 MG/DL — HIGH (ref 70–99)
GLUCOSE SERPL-MCNC: 166 MG/DL — HIGH (ref 70–99)
HCT VFR BLD CALC: 27.7 % — LOW (ref 39–50)
HCV RNA FLD QL NAA+PROBE: SIGNIFICANT CHANGE UP
HCV RNA SPEC NAA+PROBE-LOG IU: SIGNIFICANT CHANGE UP
HCV RNA SPEC NAA+PROBE-LOG IU: SIGNIFICANT CHANGE UP LOGIU/ML
HCV RNA SPEC QL PROBE+SIG AMP: SIGNIFICANT CHANGE UP
HGB BLD-MCNC: 8.1 G/DL — LOW (ref 13–17)
IMM GRANULOCYTES NFR BLD AUTO: 0.6 % — SIGNIFICANT CHANGE UP (ref 0–0.9)
LYMPHOCYTES # BLD AUTO: 0.86 K/UL — LOW (ref 1–3.3)
LYMPHOCYTES # BLD AUTO: 9.8 % — LOW (ref 13–44)
MAGNESIUM SERPL-MCNC: 1.9 MG/DL — SIGNIFICANT CHANGE UP (ref 1.6–2.6)
MCHC RBC-ENTMCNC: 22.6 PG — LOW (ref 27–34)
MCHC RBC-ENTMCNC: 29.2 GM/DL — LOW (ref 32–36)
MCV RBC AUTO: 77.4 FL — LOW (ref 80–100)
MONOCYTES # BLD AUTO: 0.81 K/UL — SIGNIFICANT CHANGE UP (ref 0–0.9)
MONOCYTES NFR BLD AUTO: 9.2 % — SIGNIFICANT CHANGE UP (ref 2–14)
NEUTROPHILS # BLD AUTO: 6.94 K/UL — SIGNIFICANT CHANGE UP (ref 1.8–7.4)
NEUTROPHILS NFR BLD AUTO: 78.6 % — HIGH (ref 43–77)
NRBC # BLD: 0 /100 WBCS — SIGNIFICANT CHANGE UP (ref 0–0)
NRBC BLD-RTO: 0 /100 WBCS — SIGNIFICANT CHANGE UP (ref 0–0)
OSMOLALITY UR: 458 MOSM/KG — SIGNIFICANT CHANGE UP (ref 300–900)
PHOSPHATE SERPL-MCNC: 3.3 MG/DL — SIGNIFICANT CHANGE UP (ref 2.5–4.5)
PLATELET # BLD AUTO: 177 K/UL — SIGNIFICANT CHANGE UP (ref 150–400)
POTASSIUM SERPL-MCNC: 3.7 MMOL/L — SIGNIFICANT CHANGE UP (ref 3.5–5.3)
POTASSIUM SERPL-SCNC: 3.7 MMOL/L — SIGNIFICANT CHANGE UP (ref 3.5–5.3)
RBC # BLD: 3.58 M/UL — LOW (ref 4.2–5.8)
RBC # FLD: 17 % — HIGH (ref 10.3–14.5)
RH IG SCN BLD-IMP: POSITIVE — SIGNIFICANT CHANGE UP
SODIUM SERPL-SCNC: 138 MMOL/L — SIGNIFICANT CHANGE UP (ref 135–145)
SODIUM UR-SCNC: 110 MMOL/L — SIGNIFICANT CHANGE UP
UUN UR-MCNC: 438 MG/DL — SIGNIFICANT CHANGE UP
VIT B12 SERPL-MCNC: 371 PG/ML — SIGNIFICANT CHANGE UP (ref 232–1245)
WBC # BLD: 8.82 K/UL — SIGNIFICANT CHANGE UP (ref 3.8–10.5)
WBC # FLD AUTO: 8.82 K/UL — SIGNIFICANT CHANGE UP (ref 3.8–10.5)

## 2024-09-25 PROCEDURE — 99233 SBSQ HOSP IP/OBS HIGH 50: CPT

## 2024-09-25 PROCEDURE — 99221 1ST HOSP IP/OBS SF/LOW 40: CPT

## 2024-09-25 PROCEDURE — 74181 MRI ABDOMEN W/O CONTRAST: CPT | Mod: 26

## 2024-09-25 PROCEDURE — 99232 SBSQ HOSP IP/OBS MODERATE 35: CPT

## 2024-09-25 RX ORDER — FUROSEMIDE 10 MG/ML
40 INJECTION INTRAMUSCULAR; INTRAVENOUS ONCE
Refills: 0 | Status: COMPLETED | OUTPATIENT
Start: 2024-09-25 | End: 2024-09-25

## 2024-09-25 RX ORDER — EMPAGLIFLOZIN 25 MG/1
1 TABLET, FILM COATED ORAL
Qty: 30 | Refills: 0
Start: 2024-09-25 | End: 2024-10-24

## 2024-09-25 RX ORDER — LISINOPRIL 5 MG/1
20 TABLET ORAL DAILY
Refills: 0 | Status: DISCONTINUED | OUTPATIENT
Start: 2024-09-25 | End: 2024-09-25

## 2024-09-25 RX ORDER — FUROSEMIDE 10 MG/ML
40 INJECTION INTRAMUSCULAR; INTRAVENOUS ONCE
Refills: 0 | Status: COMPLETED | OUTPATIENT
Start: 2024-09-26 | End: 2024-09-26

## 2024-09-25 RX ORDER — DAPAGLIFLOZIN 5 MG/1
1 TABLET, FILM COATED ORAL
Qty: 30 | Refills: 0
Start: 2024-09-25 | End: 2024-10-24

## 2024-09-25 RX ORDER — LISINOPRIL 5 MG/1
20 TABLET ORAL DAILY
Refills: 0 | Status: DISCONTINUED | OUTPATIENT
Start: 2024-09-25 | End: 2024-09-26

## 2024-09-25 RX ADMIN — Medication 650 MILLIGRAM(S): at 10:43

## 2024-09-25 RX ADMIN — INSULIN LISPRO 1: 100 INJECTION, SOLUTION INTRAVENOUS; SUBCUTANEOUS at 18:25

## 2024-09-25 RX ADMIN — NICOTINE POLACRILEX 1 PATCH: 4 GUM, CHEWING ORAL at 13:37

## 2024-09-25 RX ADMIN — Medication 40 MILLIGRAM(S): at 13:36

## 2024-09-25 RX ADMIN — IPRATROPIUM BROMIDE AND ALBUTEROL SULFATE 3 MILLILITER(S): .5; 2.5 SOLUTION RESPIRATORY (INHALATION) at 05:48

## 2024-09-25 RX ADMIN — Medication 650 MILLIGRAM(S): at 11:13

## 2024-09-25 RX ADMIN — IPRATROPIUM BROMIDE AND ALBUTEROL SULFATE 3 MILLILITER(S): .5; 2.5 SOLUTION RESPIRATORY (INHALATION) at 17:39

## 2024-09-25 RX ADMIN — INSULIN LISPRO 2: 100 INJECTION, SOLUTION INTRAVENOUS; SUBCUTANEOUS at 13:36

## 2024-09-25 RX ADMIN — IPRATROPIUM BROMIDE AND ALBUTEROL SULFATE 3 MILLILITER(S): .5; 2.5 SOLUTION RESPIRATORY (INHALATION) at 09:46

## 2024-09-25 RX ADMIN — Medication 40 MILLIEQUIVALENT(S): at 10:43

## 2024-09-25 RX ADMIN — LISINOPRIL 20 MILLIGRAM(S): 5 TABLET ORAL at 20:57

## 2024-09-25 RX ADMIN — INSULIN LISPRO 2: 100 INJECTION, SOLUTION INTRAVENOUS; SUBCUTANEOUS at 09:45

## 2024-09-25 RX ADMIN — FUROSEMIDE 40 MILLIGRAM(S): 10 INJECTION INTRAMUSCULAR; INTRAVENOUS at 17:39

## 2024-09-25 RX ADMIN — NICOTINE POLACRILEX 1 PATCH: 4 GUM, CHEWING ORAL at 07:39

## 2024-09-25 RX ADMIN — Medication 55 MILLIGRAM(S): at 13:37

## 2024-09-25 RX ADMIN — IPRATROPIUM BROMIDE AND ALBUTEROL SULFATE 3 MILLILITER(S): .5; 2.5 SOLUTION RESPIRATORY (INHALATION) at 23:01

## 2024-09-26 PROBLEM — E11.9 TYPE 2 DIABETES MELLITUS WITHOUT COMPLICATIONS: Chronic | Status: ACTIVE | Noted: 2024-09-23

## 2024-09-26 PROBLEM — I10 ESSENTIAL (PRIMARY) HYPERTENSION: Chronic | Status: ACTIVE | Noted: 2024-09-23

## 2024-09-26 PROBLEM — F11.20 OPIOID DEPENDENCE, UNCOMPLICATED: Chronic | Status: ACTIVE | Noted: 2024-09-23

## 2024-09-26 LAB
ALBUMIN SERPL ELPH-MCNC: 4 G/DL — SIGNIFICANT CHANGE UP (ref 3.3–5)
ALP SERPL-CCNC: 98 U/L — SIGNIFICANT CHANGE UP (ref 40–120)
ALT FLD-CCNC: 7 U/L — LOW (ref 10–45)
ANION GAP SERPL CALC-SCNC: 12 MMOL/L — SIGNIFICANT CHANGE UP (ref 5–17)
AST SERPL-CCNC: 11 U/L — SIGNIFICANT CHANGE UP (ref 10–40)
BASOPHILS # BLD AUTO: 0.04 K/UL — SIGNIFICANT CHANGE UP (ref 0–0.2)
BASOPHILS NFR BLD AUTO: 0.4 % — SIGNIFICANT CHANGE UP (ref 0–2)
BILIRUB SERPL-MCNC: 0.5 MG/DL — SIGNIFICANT CHANGE UP (ref 0.2–1.2)
BUN SERPL-MCNC: 21 MG/DL — SIGNIFICANT CHANGE UP (ref 7–23)
CALCIUM SERPL-MCNC: 9.5 MG/DL — SIGNIFICANT CHANGE UP (ref 8.4–10.5)
CHLORIDE SERPL-SCNC: 99 MMOL/L — SIGNIFICANT CHANGE UP (ref 96–108)
CO2 SERPL-SCNC: 28 MMOL/L — SIGNIFICANT CHANGE UP (ref 22–31)
CREAT SERPL-MCNC: 1.59 MG/DL — HIGH (ref 0.5–1.3)
EGFR: 48 ML/MIN/1.73M2 — LOW
EGFR: 48 ML/MIN/1.73M2 — LOW
EOSINOPHIL # BLD AUTO: 0.31 K/UL — SIGNIFICANT CHANGE UP (ref 0–0.5)
EOSINOPHIL NFR BLD AUTO: 2.9 % — SIGNIFICANT CHANGE UP (ref 0–6)
GLUCOSE BLDC GLUCOMTR-MCNC: 149 MG/DL — HIGH (ref 70–99)
GLUCOSE BLDC GLUCOMTR-MCNC: 164 MG/DL — HIGH (ref 70–99)
GLUCOSE BLDC GLUCOMTR-MCNC: 198 MG/DL — HIGH (ref 70–99)
GLUCOSE BLDC GLUCOMTR-MCNC: 204 MG/DL — HIGH (ref 70–99)
GLUCOSE SERPL-MCNC: 177 MG/DL — HIGH (ref 70–99)
HCT VFR BLD CALC: 27.4 % — LOW (ref 39–50)
HGB BLD-MCNC: 8.3 G/DL — LOW (ref 13–17)
IMM GRANULOCYTES NFR BLD AUTO: 0.6 % — SIGNIFICANT CHANGE UP (ref 0–0.9)
LYMPHOCYTES # BLD AUTO: 1.08 K/UL — SIGNIFICANT CHANGE UP (ref 1–3.3)
LYMPHOCYTES # BLD AUTO: 10.1 % — LOW (ref 13–44)
MAGNESIUM SERPL-MCNC: 2 MG/DL — SIGNIFICANT CHANGE UP (ref 1.6–2.6)
MCHC RBC-ENTMCNC: 24.1 PG — LOW (ref 27–34)
MCHC RBC-ENTMCNC: 30.3 GM/DL — LOW (ref 32–36)
MCV RBC AUTO: 79.4 FL — LOW (ref 80–100)
MONOCYTES # BLD AUTO: 1.4 K/UL — HIGH (ref 0–0.9)
MONOCYTES NFR BLD AUTO: 13 % — SIGNIFICANT CHANGE UP (ref 2–14)
NEUTROPHILS # BLD AUTO: 7.84 K/UL — HIGH (ref 1.8–7.4)
NEUTROPHILS NFR BLD AUTO: 73 % — SIGNIFICANT CHANGE UP (ref 43–77)
NRBC # BLD: 0 /100 WBCS — SIGNIFICANT CHANGE UP (ref 0–0)
NRBC BLD-RTO: 0 /100 WBCS — SIGNIFICANT CHANGE UP (ref 0–0)
PHOSPHATE SERPL-MCNC: 3.7 MG/DL — SIGNIFICANT CHANGE UP (ref 2.5–4.5)
PLATELET # BLD AUTO: 171 K/UL — SIGNIFICANT CHANGE UP (ref 150–400)
POTASSIUM SERPL-MCNC: 4.1 MMOL/L — SIGNIFICANT CHANGE UP (ref 3.5–5.3)
POTASSIUM SERPL-SCNC: 4.1 MMOL/L — SIGNIFICANT CHANGE UP (ref 3.5–5.3)
PROT SERPL-MCNC: 8.1 G/DL — SIGNIFICANT CHANGE UP (ref 6–8.3)
RBC # BLD: 3.45 M/UL — LOW (ref 4.2–5.8)
RBC # FLD: 18.2 % — HIGH (ref 10.3–14.5)
SODIUM SERPL-SCNC: 139 MMOL/L — SIGNIFICANT CHANGE UP (ref 135–145)
WBC # BLD: 10.73 K/UL — HIGH (ref 3.8–10.5)
WBC # FLD AUTO: 10.73 K/UL — HIGH (ref 3.8–10.5)

## 2024-09-26 PROCEDURE — 99232 SBSQ HOSP IP/OBS MODERATE 35: CPT

## 2024-09-26 PROCEDURE — 99233 SBSQ HOSP IP/OBS HIGH 50: CPT | Mod: GC

## 2024-09-26 RX ORDER — SPIRONOLACTONE 25 MG
25 TABLET ORAL DAILY
Refills: 0 | Status: DISCONTINUED | OUTPATIENT
Start: 2024-09-26 | End: 2024-09-26

## 2024-09-26 RX ORDER — LISINOPRIL 5 MG/1
20 TABLET ORAL EVERY 24 HOURS
Refills: 0 | Status: DISCONTINUED | OUTPATIENT
Start: 2024-09-26 | End: 2024-09-28

## 2024-09-26 RX ORDER — POLYETHYLENE GLYCOL-3350 AND ELECTROLYTES 236; 6.74; 5.86; 2.97; 22.74 G/274.31G; G/274.31G; G/274.31G; G/274.31G; G/274.31G
4000 POWDER, FOR SOLUTION ORAL ONCE
Refills: 0 | Status: COMPLETED | OUTPATIENT
Start: 2024-09-26 | End: 2024-09-26

## 2024-09-26 RX ADMIN — Medication 650 MILLIGRAM(S): at 10:04

## 2024-09-26 RX ADMIN — NICOTINE POLACRILEX 1 PATCH: 4 GUM, CHEWING ORAL at 18:10

## 2024-09-26 RX ADMIN — Medication 55 MILLIGRAM(S): at 13:53

## 2024-09-26 RX ADMIN — IPRATROPIUM BROMIDE AND ALBUTEROL SULFATE 3 MILLILITER(S): .5; 2.5 SOLUTION RESPIRATORY (INHALATION) at 04:08

## 2024-09-26 RX ADMIN — INSULIN LISPRO 1: 100 INJECTION, SOLUTION INTRAVENOUS; SUBCUTANEOUS at 22:21

## 2024-09-26 RX ADMIN — IPRATROPIUM BROMIDE AND ALBUTEROL SULFATE 3 MILLILITER(S): .5; 2.5 SOLUTION RESPIRATORY (INHALATION) at 17:03

## 2024-09-26 RX ADMIN — Medication 40 MILLIGRAM(S): at 13:53

## 2024-09-26 RX ADMIN — LISINOPRIL 20 MILLIGRAM(S): 5 TABLET ORAL at 21:44

## 2024-09-26 RX ADMIN — NICOTINE POLACRILEX 1 PATCH: 4 GUM, CHEWING ORAL at 14:28

## 2024-09-26 RX ADMIN — FUROSEMIDE 40 MILLIGRAM(S): 10 INJECTION INTRAMUSCULAR; INTRAVENOUS at 03:16

## 2024-09-26 RX ADMIN — POLYETHYLENE GLYCOL-3350 AND ELECTROLYTES 4000 MILLILITER(S): 236; 6.74; 5.86; 2.97; 22.74 POWDER, FOR SOLUTION ORAL at 17:03

## 2024-09-26 RX ADMIN — Medication 40 MILLIGRAM(S): at 00:40

## 2024-09-26 RX ADMIN — IPRATROPIUM BROMIDE AND ALBUTEROL SULFATE 3 MILLILITER(S): .5; 2.5 SOLUTION RESPIRATORY (INHALATION) at 22:03

## 2024-09-26 RX ADMIN — NICOTINE POLACRILEX 1 PATCH: 4 GUM, CHEWING ORAL at 06:53

## 2024-09-26 RX ADMIN — INSULIN LISPRO 1: 100 INJECTION, SOLUTION INTRAVENOUS; SUBCUTANEOUS at 18:04

## 2024-09-26 RX ADMIN — NICOTINE POLACRILEX 1 PATCH: 4 GUM, CHEWING ORAL at 13:03

## 2024-09-26 RX ADMIN — Medication 650 MILLIGRAM(S): at 10:34

## 2024-09-26 RX ADMIN — Medication 40 MILLIGRAM(S): at 23:03

## 2024-09-26 RX ADMIN — INSULIN LISPRO 2: 100 INJECTION, SOLUTION INTRAVENOUS; SUBCUTANEOUS at 10:03

## 2024-09-26 RX ADMIN — IPRATROPIUM BROMIDE AND ALBUTEROL SULFATE 3 MILLILITER(S): .5; 2.5 SOLUTION RESPIRATORY (INHALATION) at 13:04

## 2024-09-27 ENCOUNTER — RESULT REVIEW (OUTPATIENT)
Age: 69
End: 2024-09-27

## 2024-09-27 ENCOUNTER — TRANSCRIPTION ENCOUNTER (OUTPATIENT)
Age: 69
End: 2024-09-27

## 2024-09-27 LAB
ALBUMIN SERPL ELPH-MCNC: 3.8 G/DL — SIGNIFICANT CHANGE UP (ref 3.3–5)
ALP SERPL-CCNC: 93 U/L — SIGNIFICANT CHANGE UP (ref 40–120)
ALT FLD-CCNC: 7 U/L — LOW (ref 10–45)
ANION GAP SERPL CALC-SCNC: 11 MMOL/L — SIGNIFICANT CHANGE UP (ref 5–17)
ANISOCYTOSIS BLD QL: SLIGHT — SIGNIFICANT CHANGE UP
AST SERPL-CCNC: 10 U/L — SIGNIFICANT CHANGE UP (ref 10–40)
BASOPHILS # BLD AUTO: 0 K/UL — SIGNIFICANT CHANGE UP (ref 0–0.2)
BASOPHILS NFR BLD AUTO: 0 % — SIGNIFICANT CHANGE UP (ref 0–2)
BILIRUB SERPL-MCNC: 0.4 MG/DL — SIGNIFICANT CHANGE UP (ref 0.2–1.2)
BUN SERPL-MCNC: 26 MG/DL — HIGH (ref 7–23)
CALCIUM SERPL-MCNC: 9 MG/DL — SIGNIFICANT CHANGE UP (ref 8.4–10.5)
CHLORIDE SERPL-SCNC: 99 MMOL/L — SIGNIFICANT CHANGE UP (ref 96–108)
CO2 SERPL-SCNC: 28 MMOL/L — SIGNIFICANT CHANGE UP (ref 22–31)
CREAT SERPL-MCNC: 1.53 MG/DL — HIGH (ref 0.5–1.3)
EGFR: 50 ML/MIN/1.73M2 — LOW
EGFR: 50 ML/MIN/1.73M2 — LOW
EOSINOPHIL # BLD AUTO: 0.26 K/UL — SIGNIFICANT CHANGE UP (ref 0–0.5)
EOSINOPHIL NFR BLD AUTO: 2.7 % — SIGNIFICANT CHANGE UP (ref 0–6)
GIANT PLATELETS BLD QL SMEAR: PRESENT — SIGNIFICANT CHANGE UP
GLUCOSE BLDC GLUCOMTR-MCNC: 183 MG/DL — HIGH (ref 70–99)
GLUCOSE BLDC GLUCOMTR-MCNC: 204 MG/DL — HIGH (ref 70–99)
GLUCOSE BLDC GLUCOMTR-MCNC: 228 MG/DL — HIGH (ref 70–99)
GLUCOSE BLDC GLUCOMTR-MCNC: 265 MG/DL — HIGH (ref 70–99)
GLUCOSE SERPL-MCNC: 169 MG/DL — HIGH (ref 70–99)
HCT VFR BLD CALC: 25.2 % — LOW (ref 39–50)
HGB BLD-MCNC: 7.4 G/DL — LOW (ref 13–17)
HYPOCHROMIA BLD QL: SIGNIFICANT CHANGE UP
LYMPHOCYTES # BLD AUTO: 1.46 K/UL — SIGNIFICANT CHANGE UP (ref 1–3.3)
LYMPHOCYTES # BLD AUTO: 15.3 % — SIGNIFICANT CHANGE UP (ref 13–44)
MAGNESIUM SERPL-MCNC: 1.9 MG/DL — SIGNIFICANT CHANGE UP (ref 1.6–2.6)
MANUAL SMEAR VERIFICATION: SIGNIFICANT CHANGE UP
MCHC RBC-ENTMCNC: 22.8 PG — LOW (ref 27–34)
MCHC RBC-ENTMCNC: 29.4 GM/DL — LOW (ref 32–36)
MCV RBC AUTO: 77.8 FL — LOW (ref 80–100)
MICROCYTES BLD QL: SLIGHT — SIGNIFICANT CHANGE UP
MONOCYTES # BLD AUTO: 0.6 K/UL — SIGNIFICANT CHANGE UP (ref 0–0.9)
MONOCYTES NFR BLD AUTO: 6.3 % — SIGNIFICANT CHANGE UP (ref 2–14)
NEUTROPHILS # BLD AUTO: 7.2 K/UL — SIGNIFICANT CHANGE UP (ref 1.8–7.4)
NEUTROPHILS NFR BLD AUTO: 75.7 % — SIGNIFICANT CHANGE UP (ref 43–77)
OVALOCYTES BLD QL SMEAR: SLIGHT — SIGNIFICANT CHANGE UP
PHOSPHATE SERPL-MCNC: 3.8 MG/DL — SIGNIFICANT CHANGE UP (ref 2.5–4.5)
PLAT MORPH BLD: ABNORMAL
PLATELET # BLD AUTO: 166 K/UL — SIGNIFICANT CHANGE UP (ref 150–400)
POLYCHROMASIA BLD QL SMEAR: SLIGHT — SIGNIFICANT CHANGE UP
POTASSIUM SERPL-MCNC: 4 MMOL/L — SIGNIFICANT CHANGE UP (ref 3.5–5.3)
POTASSIUM SERPL-SCNC: 4 MMOL/L — SIGNIFICANT CHANGE UP (ref 3.5–5.3)
PROT SERPL-MCNC: 7.9 G/DL — SIGNIFICANT CHANGE UP (ref 6–8.3)
RBC # BLD: 3.24 M/UL — LOW (ref 4.2–5.8)
RBC # FLD: 18.6 % — HIGH (ref 10.3–14.5)
RBC BLD AUTO: ABNORMAL
SODIUM SERPL-SCNC: 138 MMOL/L — SIGNIFICANT CHANGE UP (ref 135–145)
WBC # BLD: 9.51 K/UL — SIGNIFICANT CHANGE UP (ref 3.8–10.5)
WBC # FLD AUTO: 9.51 K/UL — SIGNIFICANT CHANGE UP (ref 3.8–10.5)

## 2024-09-27 PROCEDURE — 88305 TISSUE EXAM BY PATHOLOGIST: CPT | Mod: 26

## 2024-09-27 PROCEDURE — 45382 COLONOSCOPY W/CONTROL BLEED: CPT

## 2024-09-27 PROCEDURE — 43239 EGD BIOPSY SINGLE/MULTIPLE: CPT

## 2024-09-27 PROCEDURE — 88342 IMHCHEM/IMCYTCHM 1ST ANTB: CPT | Mod: 26

## 2024-09-27 PROCEDURE — 99232 SBSQ HOSP IP/OBS MODERATE 35: CPT

## 2024-09-27 DEVICE — CLIP RESOLUTION 360 ULTRA 235CM 20/BX: Type: IMPLANTABLE DEVICE | Status: FUNCTIONAL

## 2024-09-27 DEVICE — PROBE FIAPC DIA 2.3MM/7FR LNTH 220CM/7.2FT: Type: IMPLANTABLE DEVICE | Status: FUNCTIONAL

## 2024-09-27 RX ADMIN — NICOTINE POLACRILEX 1 PATCH: 4 GUM, CHEWING ORAL at 14:18

## 2024-09-27 RX ADMIN — IPRATROPIUM BROMIDE AND ALBUTEROL SULFATE 3 MILLILITER(S): .5; 2.5 SOLUTION RESPIRATORY (INHALATION) at 23:02

## 2024-09-27 RX ADMIN — INSULIN LISPRO 3: 100 INJECTION, SOLUTION INTRAVENOUS; SUBCUTANEOUS at 17:55

## 2024-09-27 RX ADMIN — Medication 55 MILLIGRAM(S): at 16:55

## 2024-09-27 RX ADMIN — NICOTINE POLACRILEX 1 PATCH: 4 GUM, CHEWING ORAL at 08:15

## 2024-09-27 RX ADMIN — IPRATROPIUM BROMIDE AND ALBUTEROL SULFATE 3 MILLILITER(S): .5; 2.5 SOLUTION RESPIRATORY (INHALATION) at 10:26

## 2024-09-27 RX ADMIN — IPRATROPIUM BROMIDE AND ALBUTEROL SULFATE 3 MILLILITER(S): .5; 2.5 SOLUTION RESPIRATORY (INHALATION) at 05:14

## 2024-09-27 RX ADMIN — INSULIN LISPRO 2: 100 INJECTION, SOLUTION INTRAVENOUS; SUBCUTANEOUS at 23:01

## 2024-09-27 RX ADMIN — INSULIN LISPRO 2: 100 INJECTION, SOLUTION INTRAVENOUS; SUBCUTANEOUS at 09:21

## 2024-09-27 RX ADMIN — LISINOPRIL 20 MILLIGRAM(S): 5 TABLET ORAL at 21:19

## 2024-09-28 ENCOUNTER — TRANSCRIPTION ENCOUNTER (OUTPATIENT)
Age: 69
End: 2024-09-28

## 2024-09-28 ENCOUNTER — NON-APPOINTMENT (OUTPATIENT)
Age: 69
End: 2024-09-28

## 2024-09-28 VITALS
HEART RATE: 73 BPM | OXYGEN SATURATION: 96 % | SYSTOLIC BLOOD PRESSURE: 145 MMHG | TEMPERATURE: 98 F | DIASTOLIC BLOOD PRESSURE: 64 MMHG | RESPIRATION RATE: 18 BRPM

## 2024-09-28 LAB
ANION GAP SERPL CALC-SCNC: 13 MMOL/L — SIGNIFICANT CHANGE UP (ref 5–17)
BASOPHILS # BLD AUTO: 0.05 K/UL — SIGNIFICANT CHANGE UP (ref 0–0.2)
BASOPHILS NFR BLD AUTO: 0.5 % — SIGNIFICANT CHANGE UP (ref 0–2)
BUN SERPL-MCNC: 24 MG/DL — HIGH (ref 7–23)
CALCIUM SERPL-MCNC: 9.1 MG/DL — SIGNIFICANT CHANGE UP (ref 8.4–10.5)
CHLORIDE SERPL-SCNC: 96 MMOL/L — SIGNIFICANT CHANGE UP (ref 96–108)
CO2 SERPL-SCNC: 26 MMOL/L — SIGNIFICANT CHANGE UP (ref 22–31)
CREAT SERPL-MCNC: 1.62 MG/DL — HIGH (ref 0.5–1.3)
EGFR: 47 ML/MIN/1.73M2 — LOW
EGFR: 47 ML/MIN/1.73M2 — LOW
EOSINOPHIL # BLD AUTO: 0.23 K/UL — SIGNIFICANT CHANGE UP (ref 0–0.5)
EOSINOPHIL NFR BLD AUTO: 2.1 % — SIGNIFICANT CHANGE UP (ref 0–6)
GLUCOSE BLDC GLUCOMTR-MCNC: 191 MG/DL — HIGH (ref 70–99)
GLUCOSE BLDC GLUCOMTR-MCNC: 277 MG/DL — HIGH (ref 70–99)
GLUCOSE SERPL-MCNC: 184 MG/DL — HIGH (ref 70–99)
HCT VFR BLD CALC: 28.3 % — LOW (ref 39–50)
HGB BLD-MCNC: 8.4 G/DL — LOW (ref 13–17)
IMM GRANULOCYTES NFR BLD AUTO: 0.6 % — SIGNIFICANT CHANGE UP (ref 0–0.9)
LYMPHOCYTES # BLD AUTO: 1.01 K/UL — SIGNIFICANT CHANGE UP (ref 1–3.3)
LYMPHOCYTES # BLD AUTO: 9.4 % — LOW (ref 13–44)
MAGNESIUM SERPL-MCNC: 2.2 MG/DL — SIGNIFICANT CHANGE UP (ref 1.6–2.6)
MCHC RBC-ENTMCNC: 23.5 PG — LOW (ref 27–34)
MCHC RBC-ENTMCNC: 29.7 GM/DL — LOW (ref 32–36)
MCV RBC AUTO: 79.1 FL — LOW (ref 80–100)
MONOCYTES # BLD AUTO: 1.38 K/UL — HIGH (ref 0–0.9)
MONOCYTES NFR BLD AUTO: 12.9 % — SIGNIFICANT CHANGE UP (ref 2–14)
NEUTROPHILS # BLD AUTO: 7.99 K/UL — HIGH (ref 1.8–7.4)
NEUTROPHILS NFR BLD AUTO: 74.5 % — SIGNIFICANT CHANGE UP (ref 43–77)
NRBC # BLD: 0 /100 WBCS — SIGNIFICANT CHANGE UP (ref 0–0)
NRBC BLD-RTO: 0 /100 WBCS — SIGNIFICANT CHANGE UP (ref 0–0)
PHOSPHATE SERPL-MCNC: 3.7 MG/DL — SIGNIFICANT CHANGE UP (ref 2.5–4.5)
PLATELET # BLD AUTO: 166 K/UL — SIGNIFICANT CHANGE UP (ref 150–400)
POTASSIUM SERPL-MCNC: 3.7 MMOL/L — SIGNIFICANT CHANGE UP (ref 3.5–5.3)
POTASSIUM SERPL-SCNC: 3.7 MMOL/L — SIGNIFICANT CHANGE UP (ref 3.5–5.3)
RBC # BLD: 3.58 M/UL — LOW (ref 4.2–5.8)
RBC # FLD: 18.4 % — HIGH (ref 10.3–14.5)
SODIUM SERPL-SCNC: 135 MMOL/L — SIGNIFICANT CHANGE UP (ref 135–145)
WBC # BLD: 10.72 K/UL — HIGH (ref 3.8–10.5)
WBC # FLD AUTO: 10.72 K/UL — HIGH (ref 3.8–10.5)

## 2024-09-28 PROCEDURE — 99239 HOSP IP/OBS DSCHRG MGMT >30: CPT | Mod: GC

## 2024-09-28 PROCEDURE — 99232 SBSQ HOSP IP/OBS MODERATE 35: CPT

## 2024-09-28 RX ORDER — FERROUS SULFATE 137(45) MG
1 TABLET, EXTENDED RELEASE ORAL
Qty: 15 | Refills: 0
Start: 2024-09-28 | End: 2024-10-12

## 2024-09-28 RX ORDER — METHADONE HCL 10 MG
5.5 TABLET ORAL
Qty: 0 | Refills: 0 | DISCHARGE
Start: 2024-09-28

## 2024-09-28 RX ADMIN — Medication 650 MILLIGRAM(S): at 11:56

## 2024-09-28 RX ADMIN — Medication 650 MILLIGRAM(S): at 12:56

## 2024-09-28 RX ADMIN — INSULIN LISPRO 1: 100 INJECTION, SOLUTION INTRAVENOUS; SUBCUTANEOUS at 10:04

## 2024-09-28 RX ADMIN — IPRATROPIUM BROMIDE AND ALBUTEROL SULFATE 3 MILLILITER(S): .5; 2.5 SOLUTION RESPIRATORY (INHALATION) at 04:14

## 2024-09-28 RX ADMIN — Medication 40 MILLIGRAM(S): at 00:30

## 2024-09-28 RX ADMIN — NICOTINE POLACRILEX 1 PATCH: 4 GUM, CHEWING ORAL at 11:52

## 2024-09-28 RX ADMIN — Medication 40 MILLIGRAM(S): at 11:52

## 2024-09-28 RX ADMIN — INSULIN LISPRO 3: 100 INJECTION, SOLUTION INTRAVENOUS; SUBCUTANEOUS at 13:14

## 2024-09-28 RX ADMIN — Medication 55 MILLIGRAM(S): at 13:22

## 2024-09-30 PROBLEM — Z00.00 ENCOUNTER FOR PREVENTIVE HEALTH EXAMINATION: Status: ACTIVE | Noted: 2024-09-30

## 2024-10-01 LAB — SURGICAL PATHOLOGY STUDY: SIGNIFICANT CHANGE UP

## 2024-10-07 ENCOUNTER — APPOINTMENT (OUTPATIENT)
Dept: HEART AND VASCULAR | Facility: CLINIC | Age: 69
End: 2024-10-07
Payer: MEDICARE

## 2024-10-07 ENCOUNTER — NON-APPOINTMENT (OUTPATIENT)
Age: 69
End: 2024-10-07

## 2024-10-07 VITALS
BODY MASS INDEX: 24.43 KG/M2 | WEIGHT: 152 LBS | HEART RATE: 84 BPM | HEIGHT: 66 IN | TEMPERATURE: 98.4 F | OXYGEN SATURATION: 97 % | SYSTOLIC BLOOD PRESSURE: 160 MMHG | DIASTOLIC BLOOD PRESSURE: 82 MMHG

## 2024-10-07 DIAGNOSIS — K92.1 MELENA: ICD-10-CM

## 2024-10-07 DIAGNOSIS — I27.20 PULMONARY HYPERTENSION, UNSPECIFIED: ICD-10-CM

## 2024-10-07 DIAGNOSIS — I50.31 ACUTE DIASTOLIC (CONGESTIVE) HEART FAILURE: ICD-10-CM

## 2024-10-07 DIAGNOSIS — J43.9 EMPHYSEMA, UNSPECIFIED: ICD-10-CM

## 2024-10-07 DIAGNOSIS — N17.9 ACUTE KIDNEY FAILURE, UNSPECIFIED: ICD-10-CM

## 2024-10-07 DIAGNOSIS — E11.9 TYPE 2 DIABETES MELLITUS WITHOUT COMPLICATIONS: ICD-10-CM

## 2024-10-07 DIAGNOSIS — K55.21 ANGIODYSPLASIA OF COLON WITH HEMORRHAGE: ICD-10-CM

## 2024-10-07 DIAGNOSIS — F17.210 NICOTINE DEPENDENCE, CIGARETTES, UNCOMPLICATED: ICD-10-CM

## 2024-10-07 DIAGNOSIS — K83.8 OTHER SPECIFIED DISEASES OF BILIARY TRACT: ICD-10-CM

## 2024-10-07 DIAGNOSIS — J90 PLEURAL EFFUSION, NOT ELSEWHERE CLASSIFIED: ICD-10-CM

## 2024-10-07 DIAGNOSIS — J96.01 ACUTE RESPIRATORY FAILURE WITH HYPOXIA: ICD-10-CM

## 2024-10-07 DIAGNOSIS — I10 ESSENTIAL (PRIMARY) HYPERTENSION: ICD-10-CM

## 2024-10-07 DIAGNOSIS — Z79.899 OTHER LONG TERM (CURRENT) DRUG THERAPY: ICD-10-CM

## 2024-10-07 DIAGNOSIS — E08.9 DIABETES MELLITUS DUE TO UNDERLYING CONDITION W/OUT COMPLICATIONS: ICD-10-CM

## 2024-10-07 DIAGNOSIS — D62 ACUTE POSTHEMORRHAGIC ANEMIA: ICD-10-CM

## 2024-10-07 DIAGNOSIS — I11.0 HYPERTENSIVE HEART DISEASE WITH HEART FAILURE: ICD-10-CM

## 2024-10-07 DIAGNOSIS — F11.91 OPIOID USE, UNSPECIFIED, IN REMISSION: ICD-10-CM

## 2024-10-07 DIAGNOSIS — Z78.9 OTHER SPECIFIED HEALTH STATUS: ICD-10-CM

## 2024-10-07 DIAGNOSIS — R60.0 LOCALIZED EDEMA: ICD-10-CM

## 2024-10-07 DIAGNOSIS — Z79.84 LONG TERM (CURRENT) USE OF ORAL HYPOGLYCEMIC DRUGS: ICD-10-CM

## 2024-10-07 PROCEDURE — G2211 COMPLEX E/M VISIT ADD ON: CPT

## 2024-10-07 PROCEDURE — 99204 OFFICE O/P NEW MOD 45 MIN: CPT

## 2024-10-07 PROCEDURE — 93000 ELECTROCARDIOGRAM COMPLETE: CPT

## 2024-10-07 RX ORDER — PANTOPRAZOLE SODIUM 40 MG/10ML
40 INJECTION, POWDER, FOR SOLUTION INTRAVENOUS
Refills: 0 | Status: ACTIVE | COMMUNITY

## 2024-10-07 RX ORDER — CYCLOBENZAPRINE HYDROCHLORIDE 10 MG/1
10 TABLET, FILM COATED ORAL
Refills: 0 | Status: ACTIVE | COMMUNITY

## 2024-10-07 RX ORDER — DAPAGLIFLOZIN 10 MG/1
10 TABLET, FILM COATED ORAL DAILY
Refills: 0 | Status: ACTIVE | COMMUNITY

## 2024-10-07 RX ORDER — LISINOPRIL 20 MG/1
20 TABLET ORAL DAILY
Refills: 0 | Status: ACTIVE | COMMUNITY

## 2024-10-07 RX ORDER — METFORMIN HYDROCHLORIDE 1000 MG/1
1000 TABLET, FILM COATED, EXTENDED RELEASE ORAL
Qty: 180 | Refills: 0 | Status: ACTIVE | COMMUNITY

## 2024-10-07 RX ORDER — FUROSEMIDE 20 MG/1
20 TABLET ORAL
Qty: 30 | Refills: 3 | Status: ACTIVE | COMMUNITY
Start: 2024-10-07 | End: 1900-01-01

## 2024-10-07 RX ORDER — METHADONE HCL 5 MG/0.5ML
10 SYRINGE (ML) INJECTION
Refills: 0 | Status: ACTIVE | COMMUNITY

## 2024-10-07 RX ORDER — DICLOFENAC POTASSIUM 50 MG/1
50 TABLET, COATED ORAL
Qty: 40 | Refills: 2 | Status: ACTIVE | COMMUNITY

## 2024-10-07 RX ORDER — CHLORHEXIDINE GLUCONATE 4 %
325 (65 FE) LIQUID (ML) TOPICAL
Refills: 0 | Status: ACTIVE | COMMUNITY

## 2024-10-14 ENCOUNTER — APPOINTMENT (OUTPATIENT)
Dept: HEMATOLOGY ONCOLOGY | Facility: CLINIC | Age: 69
End: 2024-10-14

## 2024-10-15 PROCEDURE — 83540 ASSAY OF IRON: CPT

## 2024-10-15 PROCEDURE — 84300 ASSAY OF URINE SODIUM: CPT

## 2024-10-15 PROCEDURE — P9016: CPT

## 2024-10-15 PROCEDURE — 83615 LACTATE (LD) (LDH) ENZYME: CPT

## 2024-10-15 PROCEDURE — 83036 HEMOGLOBIN GLYCOSYLATED A1C: CPT

## 2024-10-15 PROCEDURE — 86803 HEPATITIS C AB TEST: CPT

## 2024-10-15 PROCEDURE — 82962 GLUCOSE BLOOD TEST: CPT

## 2024-10-15 PROCEDURE — 84100 ASSAY OF PHOSPHORUS: CPT

## 2024-10-15 PROCEDURE — 83935 ASSAY OF URINE OSMOLALITY: CPT

## 2024-10-15 PROCEDURE — 71275 CT ANGIOGRAPHY CHEST: CPT | Mod: MC

## 2024-10-15 PROCEDURE — 84466 ASSAY OF TRANSFERRIN: CPT

## 2024-10-15 PROCEDURE — C1889: CPT

## 2024-10-15 PROCEDURE — 83010 ASSAY OF HAPTOGLOBIN QUANT: CPT

## 2024-10-15 PROCEDURE — 86850 RBC ANTIBODY SCREEN: CPT

## 2024-10-15 PROCEDURE — 88305 TISSUE EXAM BY PATHOLOGIST: CPT

## 2024-10-15 PROCEDURE — 82272 OCCULT BLD FECES 1-3 TESTS: CPT

## 2024-10-15 PROCEDURE — 85610 PROTHROMBIN TIME: CPT

## 2024-10-15 PROCEDURE — 83550 IRON BINDING TEST: CPT

## 2024-10-15 PROCEDURE — 87521 HEPATITIS C PROBE&RVRS TRNSC: CPT

## 2024-10-15 PROCEDURE — 83880 ASSAY OF NATRIURETIC PEPTIDE: CPT

## 2024-10-15 PROCEDURE — 87522 HEPATITIS C REVRS TRNSCRPJ: CPT

## 2024-10-15 PROCEDURE — 86900 BLOOD TYPING SEROLOGIC ABO: CPT

## 2024-10-15 PROCEDURE — 36430 TRANSFUSION BLD/BLD COMPNT: CPT

## 2024-10-15 PROCEDURE — 96374 THER/PROPH/DIAG INJ IV PUSH: CPT

## 2024-10-15 PROCEDURE — 74174 CTA ABD&PLVS W/CONTRAST: CPT | Mod: MC

## 2024-10-15 PROCEDURE — 94640 AIRWAY INHALATION TREATMENT: CPT

## 2024-10-15 PROCEDURE — 85025 COMPLETE CBC W/AUTO DIFF WBC: CPT

## 2024-10-15 PROCEDURE — 99285 EMERGENCY DEPT VISIT HI MDM: CPT | Mod: 25

## 2024-10-15 PROCEDURE — 82728 ASSAY OF FERRITIN: CPT

## 2024-10-15 PROCEDURE — 80048 BASIC METABOLIC PNL TOTAL CA: CPT

## 2024-10-15 PROCEDURE — 83735 ASSAY OF MAGNESIUM: CPT

## 2024-10-15 PROCEDURE — 86901 BLOOD TYPING SEROLOGIC RH(D): CPT

## 2024-10-15 PROCEDURE — 71045 X-RAY EXAM CHEST 1 VIEW: CPT

## 2024-10-15 PROCEDURE — 86923 COMPATIBILITY TEST ELECTRIC: CPT

## 2024-10-15 PROCEDURE — 84540 ASSAY OF URINE/UREA-N: CPT

## 2024-10-15 PROCEDURE — 82607 VITAMIN B-12: CPT

## 2024-10-15 PROCEDURE — 82570 ASSAY OF URINE CREATININE: CPT

## 2024-10-15 PROCEDURE — 80053 COMPREHEN METABOLIC PANEL: CPT

## 2024-10-15 PROCEDURE — 74181 MRI ABDOMEN W/O CONTRAST: CPT | Mod: MC

## 2024-10-15 PROCEDURE — 85027 COMPLETE CBC AUTOMATED: CPT

## 2024-10-15 PROCEDURE — 88342 IMHCHEM/IMCYTCHM 1ST ANTB: CPT

## 2024-10-15 PROCEDURE — 82746 ASSAY OF FOLIC ACID SERUM: CPT

## 2024-10-15 PROCEDURE — 36415 COLL VENOUS BLD VENIPUNCTURE: CPT

## 2024-10-15 PROCEDURE — 85730 THROMBOPLASTIN TIME PARTIAL: CPT

## 2024-10-15 PROCEDURE — 93306 TTE W/DOPPLER COMPLETE: CPT

## 2024-10-18 ENCOUNTER — APPOINTMENT (OUTPATIENT)
Dept: HEMATOLOGY ONCOLOGY | Facility: CLINIC | Age: 69
End: 2024-10-18
Payer: MEDICARE

## 2024-10-18 VITALS
DIASTOLIC BLOOD PRESSURE: 70 MMHG | SYSTOLIC BLOOD PRESSURE: 178 MMHG | WEIGHT: 153 LBS | HEIGHT: 66 IN | OXYGEN SATURATION: 97 % | BODY MASS INDEX: 24.59 KG/M2 | HEART RATE: 88 BPM | RESPIRATION RATE: 18 BRPM | TEMPERATURE: 97.5 F

## 2024-10-18 DIAGNOSIS — D50.9 IRON DEFICIENCY ANEMIA, UNSPECIFIED: ICD-10-CM

## 2024-10-18 LAB
HAPTOGLOB SERPL-MCNC: 158 MG/DL
HCT VFR BLD CALC: 28.3 %
HGB BLD-MCNC: 8.6 G/DL
LDH SERPL-CCNC: 186 U/L
LYMPHOCYTES # BLD AUTO: 1.6 K/UL
LYMPHOCYTES NFR BLD AUTO: 20.4 %
MAN DIFF?: NO
MCHC RBC-ENTMCNC: 24.1 PG
MCHC RBC-ENTMCNC: 30.4 GM/DL
MCV RBC AUTO: 79.3 FL
NEUTROPHILS # BLD AUTO: 5.5 K/UL
NEUTROPHILS NFR BLD AUTO: 71.2 %
PLATELET # BLD AUTO: 353 K/UL
RBC # BLD: 3.57 M/UL
RBC # BLD: 3.64 M/UL
RBC # FLD: 20.3 %
RETICS # AUTO: 1.1 %
RETICS AGGREG/RBC NFR: 38.6 K/UL
WBC # FLD AUTO: 7.8 K/UL

## 2024-10-18 PROCEDURE — 99213 OFFICE O/P EST LOW 20 MIN: CPT

## 2024-10-22 LAB
FERRITIN SERPL-MCNC: 18 NG/ML
IRON SATN MFR SERPL: 10 %
IRON SERPL-MCNC: 40 UG/DL
TIBC SERPL-MCNC: 387 UG/DL
TSH SERPL-ACNC: 1.38 UIU/ML
UIBC SERPL-MCNC: 347 UG/DL

## 2024-11-18 ENCOUNTER — APPOINTMENT (OUTPATIENT)
Dept: HEART AND VASCULAR | Facility: CLINIC | Age: 69
End: 2024-11-18

## 2024-12-03 ENCOUNTER — LABORATORY RESULT (OUTPATIENT)
Age: 69
End: 2024-12-03

## 2024-12-03 ENCOUNTER — NON-APPOINTMENT (OUTPATIENT)
Age: 69
End: 2024-12-03

## 2024-12-03 ENCOUNTER — APPOINTMENT (OUTPATIENT)
Dept: GASTROENTEROLOGY | Facility: CLINIC | Age: 69
End: 2024-12-03
Payer: MEDICARE

## 2024-12-03 VITALS
WEIGHT: 155 LBS | HEIGHT: 66 IN | OXYGEN SATURATION: 99 % | DIASTOLIC BLOOD PRESSURE: 84 MMHG | HEART RATE: 85 BPM | TEMPERATURE: 98 F | BODY MASS INDEX: 24.91 KG/M2 | RESPIRATION RATE: 16 BRPM | SYSTOLIC BLOOD PRESSURE: 168 MMHG

## 2024-12-03 DIAGNOSIS — D50.9 IRON DEFICIENCY ANEMIA, UNSPECIFIED: ICD-10-CM

## 2024-12-03 DIAGNOSIS — I50.9 HEART FAILURE, UNSPECIFIED: ICD-10-CM

## 2024-12-03 DIAGNOSIS — Z12.11 ENCOUNTER FOR SCREENING FOR MALIGNANT NEOPLASM OF COLON: ICD-10-CM

## 2024-12-03 DIAGNOSIS — K31.A0 GASTRIC INTESTINAL METAPLASIA, UNSPECIFIED: ICD-10-CM

## 2024-12-03 DIAGNOSIS — K83.8 OTHER SPECIFIED DISEASES OF BILIARY TRACT: ICD-10-CM

## 2024-12-03 PROCEDURE — 99215 OFFICE O/P EST HI 40 MIN: CPT

## 2024-12-03 PROCEDURE — G2211 COMPLEX E/M VISIT ADD ON: CPT

## 2024-12-05 LAB
ALBUMIN SERPL ELPH-MCNC: 4 G/DL
ALP BLD-CCNC: 96 U/L
ALT SERPL-CCNC: 8 U/L
ANION GAP SERPL CALC-SCNC: 12 MMOL/L
AST SERPL-CCNC: 16 U/L
BASOPHILS # BLD AUTO: 0.1 K/UL
BASOPHILS NFR BLD AUTO: 0.9 %
BILIRUB SERPL-MCNC: 0.2 MG/DL
BUN SERPL-MCNC: 23 MG/DL
CALCIUM SERPL-MCNC: 9.6 MG/DL
CHLORIDE SERPL-SCNC: 102 MMOL/L
CO2 SERPL-SCNC: 24 MMOL/L
CREAT SERPL-MCNC: 1.46 MG/DL
EGFR: 52 ML/MIN/1.73M2
EOSINOPHIL # BLD AUTO: 0.38 K/UL
EOSINOPHIL NFR BLD AUTO: 3.5 %
FERRITIN SERPL-MCNC: 24 NG/ML
GLUCOSE SERPL-MCNC: 166 MG/DL
HCT VFR BLD CALC: 31 %
HGB BLD-MCNC: 9.3 G/DL
IRON SATN MFR SERPL: 10 %
IRON SERPL-MCNC: 40 UG/DL
LYMPHOCYTES # BLD AUTO: 1.99 K/UL
LYMPHOCYTES NFR BLD AUTO: 18.3 %
MAN DIFF?: NORMAL
MCHC RBC-ENTMCNC: 25.2 PG
MCHC RBC-ENTMCNC: 30 G/DL
MCV RBC AUTO: 84 FL
MONOCYTES # BLD AUTO: 0.75 K/UL
MONOCYTES NFR BLD AUTO: 6.9 %
NEUTROPHILS # BLD AUTO: 7.65 K/UL
NEUTROPHILS NFR BLD AUTO: 70.4 %
PLATELET # BLD AUTO: 248 K/UL
POTASSIUM SERPL-SCNC: 4.9 MMOL/L
PROT SERPL-MCNC: 7.7 G/DL
RBC # BLD: 3.69 M/UL
RBC # FLD: 21.9 %
SODIUM SERPL-SCNC: 138 MMOL/L
TIBC SERPL-MCNC: 398 UG/DL
UIBC SERPL-MCNC: 358 UG/DL
WBC # FLD AUTO: 10.87 K/UL

## 2024-12-18 ENCOUNTER — APPOINTMENT (OUTPATIENT)
Dept: HEMATOLOGY ONCOLOGY | Facility: CLINIC | Age: 69
End: 2024-12-18
Payer: MEDICARE

## 2024-12-18 ENCOUNTER — LABORATORY RESULT (OUTPATIENT)
Age: 69
End: 2024-12-18

## 2024-12-18 VITALS
OXYGEN SATURATION: 97 % | HEIGHT: 66 IN | TEMPERATURE: 98.3 F | WEIGHT: 157 LBS | RESPIRATION RATE: 18 BRPM | DIASTOLIC BLOOD PRESSURE: 74 MMHG | SYSTOLIC BLOOD PRESSURE: 164 MMHG | BODY MASS INDEX: 25.23 KG/M2 | HEART RATE: 74 BPM

## 2024-12-18 DIAGNOSIS — D50.9 IRON DEFICIENCY ANEMIA, UNSPECIFIED: ICD-10-CM

## 2024-12-18 PROCEDURE — 99213 OFFICE O/P EST LOW 20 MIN: CPT

## 2024-12-20 LAB
BASOPHILS # BLD AUTO: 0 K/UL
BASOPHILS NFR BLD AUTO: 0 %
EOSINOPHIL # BLD AUTO: 0.23 K/UL
EOSINOPHIL NFR BLD AUTO: 2.7 %
HCT VFR BLD CALC: 27.9 %
HGB BLD-MCNC: 8.6 G/DL
LYMPHOCYTES # BLD AUTO: 2.4 K/UL
LYMPHOCYTES NFR BLD AUTO: 28.6 %
MAN DIFF?: NORMAL
MCHC RBC-ENTMCNC: 25.4 PG
MCHC RBC-ENTMCNC: 30.8 G/DL
MCV RBC AUTO: 82.3 FL
MONOCYTES # BLD AUTO: 0.6 K/UL
MONOCYTES NFR BLD AUTO: 7.1 %
NEUTROPHILS # BLD AUTO: 5.17 K/UL
NEUTROPHILS NFR BLD AUTO: 61.6 %
PLATELET # BLD AUTO: 248 K/UL
RBC # BLD: 3.39 M/UL
RBC # FLD: 20.3 %
WBC # FLD AUTO: 8.39 K/UL

## 2025-01-03 ENCOUNTER — APPOINTMENT (OUTPATIENT)
Dept: INFUSION THERAPY | Facility: CLINIC | Age: 70
End: 2025-01-03

## 2025-01-03 ENCOUNTER — OUTPATIENT (OUTPATIENT)
Dept: OUTPATIENT SERVICES | Facility: HOSPITAL | Age: 70
LOS: 1 days | End: 2025-01-03

## 2025-01-03 DIAGNOSIS — D50.9 IRON DEFICIENCY ANEMIA, UNSPECIFIED: ICD-10-CM

## 2025-01-10 ENCOUNTER — APPOINTMENT (OUTPATIENT)
Dept: INFUSION THERAPY | Facility: CLINIC | Age: 70
End: 2025-01-10

## 2025-01-10 ENCOUNTER — OUTPATIENT (OUTPATIENT)
Dept: OUTPATIENT SERVICES | Facility: HOSPITAL | Age: 70
LOS: 1 days | End: 2025-01-10

## 2025-01-10 DIAGNOSIS — D50.9 IRON DEFICIENCY ANEMIA, UNSPECIFIED: ICD-10-CM

## 2025-01-17 ENCOUNTER — APPOINTMENT (OUTPATIENT)
Dept: INFUSION THERAPY | Facility: CLINIC | Age: 70
End: 2025-01-17

## 2025-01-24 ENCOUNTER — APPOINTMENT (OUTPATIENT)
Dept: INFUSION THERAPY | Facility: CLINIC | Age: 70
End: 2025-01-24

## 2025-02-06 ENCOUNTER — APPOINTMENT (OUTPATIENT)
Dept: GASTROENTEROLOGY | Facility: CLINIC | Age: 70
End: 2025-02-06

## 2025-02-25 ENCOUNTER — APPOINTMENT (OUTPATIENT)
Dept: HEMATOLOGY ONCOLOGY | Facility: CLINIC | Age: 70
End: 2025-02-25
Payer: MEDICARE

## 2025-02-25 VITALS
HEART RATE: 82 BPM | OXYGEN SATURATION: 96 % | RESPIRATION RATE: 18 BRPM | TEMPERATURE: 99.1 F | DIASTOLIC BLOOD PRESSURE: 68 MMHG | HEIGHT: 66 IN | BODY MASS INDEX: 25.71 KG/M2 | WEIGHT: 160 LBS | SYSTOLIC BLOOD PRESSURE: 158 MMHG

## 2025-02-25 DIAGNOSIS — D50.9 IRON DEFICIENCY ANEMIA, UNSPECIFIED: ICD-10-CM

## 2025-02-25 LAB
HCT VFR BLD CALC: 27.3 %
HGB BLD-MCNC: 8.9 G/DL
LYMPHOCYTES # BLD AUTO: 1.3 K/UL
LYMPHOCYTES NFR BLD AUTO: 15.2 %
MAN DIFF?: NO
MCHC RBC-ENTMCNC: 27.9 PG
MCHC RBC-ENTMCNC: 32.6 G/DL
MCV RBC AUTO: 85.6 FL
NEUTROPHILS # BLD AUTO: 6.4 K/UL
NEUTROPHILS NFR BLD AUTO: 77 %
PLATELET # BLD AUTO: 265 K/UL
RBC # BLD: 3.19 M/UL
RBC # FLD: 15.2 %
WBC # FLD AUTO: 8.4 K/UL

## 2025-02-25 PROCEDURE — 99213 OFFICE O/P EST LOW 20 MIN: CPT

## 2025-02-26 LAB — FERRITIN SERPL-MCNC: 30 NG/ML

## 2025-02-27 LAB
IRON SATN MFR SERPL: 11 %
IRON SERPL-MCNC: 38 UG/DL
TIBC SERPL-MCNC: 346 UG/DL
UIBC SERPL-MCNC: 308 UG/DL

## 2025-03-05 ENCOUNTER — OUTPATIENT (OUTPATIENT)
Dept: OUTPATIENT SERVICES | Facility: HOSPITAL | Age: 70
LOS: 1 days | End: 2025-03-05
Payer: MEDICARE

## 2025-03-05 ENCOUNTER — APPOINTMENT (OUTPATIENT)
Dept: INFUSION THERAPY | Facility: CLINIC | Age: 70
End: 2025-03-05

## 2025-03-05 VITALS
WEIGHT: 156.97 LBS | TEMPERATURE: 98 F | OXYGEN SATURATION: 98 % | HEIGHT: 65 IN | DIASTOLIC BLOOD PRESSURE: 66 MMHG | SYSTOLIC BLOOD PRESSURE: 165 MMHG | HEART RATE: 72 BPM | RESPIRATION RATE: 18 BRPM

## 2025-03-05 DIAGNOSIS — D50.9 IRON DEFICIENCY ANEMIA, UNSPECIFIED: ICD-10-CM

## 2025-03-05 PROCEDURE — 96365 THER/PROPH/DIAG IV INF INIT: CPT

## 2025-03-05 RX ORDER — IRON SUCROSE 20 MG/ML
200 INJECTION, SOLUTION INTRAVENOUS ONCE
Refills: 0 | Status: COMPLETED | OUTPATIENT
Start: 2025-03-05 | End: 2025-03-05

## 2025-03-05 RX ADMIN — IRON SUCROSE 220 MILLIGRAM(S): 20 INJECTION, SOLUTION INTRAVENOUS at 09:47

## 2025-03-05 RX ADMIN — IRON SUCROSE 200 MILLIGRAM(S): 20 INJECTION, SOLUTION INTRAVENOUS at 10:20

## 2025-03-12 ENCOUNTER — OUTPATIENT (OUTPATIENT)
Dept: OUTPATIENT SERVICES | Facility: HOSPITAL | Age: 70
LOS: 1 days | End: 2025-03-12

## 2025-03-12 ENCOUNTER — APPOINTMENT (OUTPATIENT)
Dept: INFUSION THERAPY | Facility: CLINIC | Age: 70
End: 2025-03-12

## 2025-03-12 DIAGNOSIS — D50.9 IRON DEFICIENCY ANEMIA, UNSPECIFIED: ICD-10-CM

## 2025-03-19 ENCOUNTER — OUTPATIENT (OUTPATIENT)
Dept: OUTPATIENT SERVICES | Facility: HOSPITAL | Age: 70
LOS: 1 days | End: 2025-03-19
Payer: MEDICARE

## 2025-03-19 ENCOUNTER — APPOINTMENT (OUTPATIENT)
Dept: INFUSION THERAPY | Facility: CLINIC | Age: 70
End: 2025-03-19

## 2025-03-19 VITALS
HEART RATE: 64 BPM | TEMPERATURE: 97 F | OXYGEN SATURATION: 99 % | SYSTOLIC BLOOD PRESSURE: 154 MMHG | DIASTOLIC BLOOD PRESSURE: 71 MMHG | RESPIRATION RATE: 18 BRPM | WEIGHT: 160.06 LBS | HEIGHT: 65 IN

## 2025-03-19 DIAGNOSIS — D50.9 IRON DEFICIENCY ANEMIA, UNSPECIFIED: ICD-10-CM

## 2025-03-19 PROCEDURE — 96365 THER/PROPH/DIAG IV INF INIT: CPT

## 2025-03-19 RX ORDER — IRON SUCROSE 20 MG/ML
200 INJECTION, SOLUTION INTRAVENOUS ONCE
Refills: 0 | Status: COMPLETED | OUTPATIENT
Start: 2025-03-19 | End: 2025-03-19

## 2025-03-19 RX ADMIN — IRON SUCROSE 200 MILLIGRAM(S): 20 INJECTION, SOLUTION INTRAVENOUS at 10:45

## 2025-03-19 RX ADMIN — IRON SUCROSE 220 MILLIGRAM(S): 20 INJECTION, SOLUTION INTRAVENOUS at 09:51

## 2025-03-26 ENCOUNTER — APPOINTMENT (OUTPATIENT)
Dept: INFUSION THERAPY | Facility: CLINIC | Age: 70
End: 2025-03-26

## 2025-03-26 ENCOUNTER — OUTPATIENT (OUTPATIENT)
Dept: OUTPATIENT SERVICES | Facility: HOSPITAL | Age: 70
LOS: 1 days | End: 2025-03-26
Payer: MEDICARE

## 2025-03-26 VITALS
WEIGHT: 160.94 LBS | HEART RATE: 71 BPM | TEMPERATURE: 98 F | HEIGHT: 60.5 IN | OXYGEN SATURATION: 97 % | RESPIRATION RATE: 16 BRPM | SYSTOLIC BLOOD PRESSURE: 147 MMHG

## 2025-03-26 DIAGNOSIS — D50.9 IRON DEFICIENCY ANEMIA, UNSPECIFIED: ICD-10-CM

## 2025-03-26 PROCEDURE — 96365 THER/PROPH/DIAG IV INF INIT: CPT

## 2025-03-26 RX ORDER — IRON SUCROSE 20 MG/ML
200 INJECTION, SOLUTION INTRAVENOUS ONCE
Refills: 0 | Status: COMPLETED | OUTPATIENT
Start: 2025-03-26 | End: 2025-03-26

## 2025-03-26 RX ADMIN — IRON SUCROSE 200 MILLIGRAM(S): 20 INJECTION, SOLUTION INTRAVENOUS at 10:35

## 2025-03-26 RX ADMIN — IRON SUCROSE 220 MILLIGRAM(S): 20 INJECTION, SOLUTION INTRAVENOUS at 09:30

## 2025-04-04 ENCOUNTER — OUTPATIENT (OUTPATIENT)
Dept: OUTPATIENT SERVICES | Facility: HOSPITAL | Age: 70
LOS: 1 days | End: 2025-04-04
Payer: MEDICARE

## 2025-04-04 ENCOUNTER — APPOINTMENT (OUTPATIENT)
Dept: INFUSION THERAPY | Facility: CLINIC | Age: 70
End: 2025-04-04

## 2025-04-04 VITALS
TEMPERATURE: 98 F | RESPIRATION RATE: 17 BRPM | SYSTOLIC BLOOD PRESSURE: 131 MMHG | DIASTOLIC BLOOD PRESSURE: 64 MMHG | HEART RATE: 63 BPM | OXYGEN SATURATION: 98 % | HEIGHT: 65 IN | WEIGHT: 158.95 LBS

## 2025-04-04 DIAGNOSIS — D50.9 IRON DEFICIENCY ANEMIA, UNSPECIFIED: ICD-10-CM

## 2025-04-04 PROCEDURE — 96365 THER/PROPH/DIAG IV INF INIT: CPT

## 2025-04-04 RX ORDER — IRON SUCROSE 20 MG/ML
200 INJECTION, SOLUTION INTRAVENOUS ONCE
Refills: 0 | Status: COMPLETED | OUTPATIENT
Start: 2025-04-04 | End: 2025-04-04

## 2025-04-04 RX ADMIN — IRON SUCROSE 220 MILLIGRAM(S): 20 INJECTION, SOLUTION INTRAVENOUS at 09:33

## 2025-04-04 RX ADMIN — IRON SUCROSE 200 MILLIGRAM(S): 20 INJECTION, SOLUTION INTRAVENOUS at 10:03

## 2025-04-19 ENCOUNTER — INPATIENT (INPATIENT)
Facility: HOSPITAL | Age: 70
LOS: 1 days | Discharge: HOME | End: 2025-04-21
Attending: STUDENT IN AN ORGANIZED HEALTH CARE EDUCATION/TRAINING PROGRAM | Admitting: STUDENT IN AN ORGANIZED HEALTH CARE EDUCATION/TRAINING PROGRAM
Payer: MEDICARE

## 2025-04-19 VITALS
DIASTOLIC BLOOD PRESSURE: 75 MMHG | OXYGEN SATURATION: 98 % | RESPIRATION RATE: 18 BRPM | HEART RATE: 89 BPM | TEMPERATURE: 99 F | SYSTOLIC BLOOD PRESSURE: 174 MMHG | HEIGHT: 65 IN | WEIGHT: 154.98 LBS

## 2025-04-19 DIAGNOSIS — I10 ESSENTIAL (PRIMARY) HYPERTENSION: ICD-10-CM

## 2025-04-19 DIAGNOSIS — I27.20 PULMONARY HYPERTENSION, UNSPECIFIED: ICD-10-CM

## 2025-04-19 DIAGNOSIS — I50.30 UNSPECIFIED DIASTOLIC (CONGESTIVE) HEART FAILURE: ICD-10-CM

## 2025-04-19 DIAGNOSIS — K83.8 OTHER SPECIFIED DISEASES OF BILIARY TRACT: ICD-10-CM

## 2025-04-19 DIAGNOSIS — K92.2 GASTROINTESTINAL HEMORRHAGE, UNSPECIFIED: ICD-10-CM

## 2025-04-19 DIAGNOSIS — B19.20 UNSPECIFIED VIRAL HEPATITIS C WITHOUT HEPATIC COMA: ICD-10-CM

## 2025-04-19 DIAGNOSIS — F19.90 OTHER PSYCHOACTIVE SUBSTANCE USE, UNSPECIFIED, UNCOMPLICATED: ICD-10-CM

## 2025-04-19 DIAGNOSIS — D62 ACUTE POSTHEMORRHAGIC ANEMIA: ICD-10-CM

## 2025-04-19 DIAGNOSIS — E11.9 TYPE 2 DIABETES MELLITUS WITHOUT COMPLICATIONS: ICD-10-CM

## 2025-04-19 DIAGNOSIS — Z29.9 ENCOUNTER FOR PROPHYLACTIC MEASURES, UNSPECIFIED: ICD-10-CM

## 2025-04-19 DIAGNOSIS — N17.9 ACUTE KIDNEY FAILURE, UNSPECIFIED: ICD-10-CM

## 2025-04-19 DIAGNOSIS — F17.200 NICOTINE DEPENDENCE, UNSPECIFIED, UNCOMPLICATED: ICD-10-CM

## 2025-04-19 LAB
ANION GAP SERPL CALC-SCNC: 14 MMOL/L — SIGNIFICANT CHANGE UP (ref 5–17)
BASOPHILS # BLD AUTO: 0.02 K/UL — SIGNIFICANT CHANGE UP (ref 0–0.2)
BASOPHILS NFR BLD AUTO: 0.3 % — SIGNIFICANT CHANGE UP (ref 0–2)
BLD GP AB SCN SERPL QL: NEGATIVE — SIGNIFICANT CHANGE UP
BLD GP AB SCN SERPL QL: NEGATIVE — SIGNIFICANT CHANGE UP
BUN SERPL-MCNC: 39 MG/DL — HIGH (ref 7–23)
CALCIUM SERPL-MCNC: 8.9 MG/DL — SIGNIFICANT CHANGE UP (ref 8.4–10.5)
CHLORIDE SERPL-SCNC: 101 MMOL/L — SIGNIFICANT CHANGE UP (ref 96–108)
CO2 SERPL-SCNC: 22 MMOL/L — SIGNIFICANT CHANGE UP (ref 22–31)
CREAT ?TM UR-MCNC: 49 MG/DL — SIGNIFICANT CHANGE UP
CREAT SERPL-MCNC: 2.16 MG/DL — HIGH (ref 0.5–1.3)
EGFR: 33 ML/MIN/1.73M2 — LOW
EGFR: 33 ML/MIN/1.73M2 — LOW
EOSINOPHIL # BLD AUTO: 0.19 K/UL — SIGNIFICANT CHANGE UP (ref 0–0.5)
EOSINOPHIL NFR BLD AUTO: 2.8 % — SIGNIFICANT CHANGE UP (ref 0–6)
FERRITIN SERPL-MCNC: 156 NG/ML — SIGNIFICANT CHANGE UP (ref 30–400)
GLUCOSE SERPL-MCNC: 150 MG/DL — HIGH (ref 70–99)
HCT VFR BLD CALC: 23.2 % — LOW (ref 39–50)
HCT VFR BLD CALC: 25.4 % — LOW (ref 39–50)
HGB BLD-MCNC: 7.7 G/DL — LOW (ref 13–17)
HGB BLD-MCNC: 8.2 G/DL — LOW (ref 13–17)
IMM GRANULOCYTES NFR BLD AUTO: 0.4 % — SIGNIFICANT CHANGE UP (ref 0–0.9)
IRON SATN MFR SERPL: 18 % — SIGNIFICANT CHANGE UP (ref 16–55)
IRON SATN MFR SERPL: 45 UG/DL — SIGNIFICANT CHANGE UP (ref 45–165)
LACTATE SERPL-SCNC: 0.9 MMOL/L — SIGNIFICANT CHANGE UP (ref 0.5–2)
LYMPHOCYTES # BLD AUTO: 0.75 K/UL — LOW (ref 1–3.3)
LYMPHOCYTES # BLD AUTO: 11 % — LOW (ref 13–44)
MCHC RBC-ENTMCNC: 28.8 PG — SIGNIFICANT CHANGE UP (ref 27–34)
MCHC RBC-ENTMCNC: 29.6 PG — SIGNIFICANT CHANGE UP (ref 27–34)
MCHC RBC-ENTMCNC: 32.3 G/DL — SIGNIFICANT CHANGE UP (ref 32–36)
MCHC RBC-ENTMCNC: 33.2 G/DL — SIGNIFICANT CHANGE UP (ref 32–36)
MCV RBC AUTO: 89.1 FL — SIGNIFICANT CHANGE UP (ref 80–100)
MCV RBC AUTO: 89.2 FL — SIGNIFICANT CHANGE UP (ref 80–100)
MONOCYTES # BLD AUTO: 0.6 K/UL — SIGNIFICANT CHANGE UP (ref 0–0.9)
MONOCYTES NFR BLD AUTO: 8.8 % — SIGNIFICANT CHANGE UP (ref 2–14)
NEUTROPHILS # BLD AUTO: 5.24 K/UL — SIGNIFICANT CHANGE UP (ref 1.8–7.4)
NEUTROPHILS NFR BLD AUTO: 76.7 % — SIGNIFICANT CHANGE UP (ref 43–77)
NRBC BLD AUTO-RTO: 0 /100 WBCS — SIGNIFICANT CHANGE UP (ref 0–0)
NRBC BLD AUTO-RTO: 0 /100 WBCS — SIGNIFICANT CHANGE UP (ref 0–0)
PLATELET # BLD AUTO: 236 K/UL — SIGNIFICANT CHANGE UP (ref 150–400)
PLATELET # BLD AUTO: 252 K/UL — SIGNIFICANT CHANGE UP (ref 150–400)
POTASSIUM SERPL-MCNC: 4.7 MMOL/L — SIGNIFICANT CHANGE UP (ref 3.5–5.3)
POTASSIUM SERPL-SCNC: 4.7 MMOL/L — SIGNIFICANT CHANGE UP (ref 3.5–5.3)
PROT ?TM UR-MCNC: 68 MG/DL — HIGH (ref 0–12)
PROT/CREAT UR-RTO: 1.4 RATIO — HIGH (ref 0–0.2)
RBC # BLD: 2.6 M/UL — LOW (ref 4.2–5.8)
RBC # BLD: 2.85 M/UL — LOW (ref 4.2–5.8)
RBC # FLD: 15 % — HIGH (ref 10.3–14.5)
RBC # FLD: 15 % — HIGH (ref 10.3–14.5)
RH IG SCN BLD-IMP: POSITIVE — SIGNIFICANT CHANGE UP
RH IG SCN BLD-IMP: POSITIVE — SIGNIFICANT CHANGE UP
SODIUM SERPL-SCNC: 137 MMOL/L — SIGNIFICANT CHANGE UP (ref 135–145)
SODIUM UR-SCNC: 108 MMOL/L — SIGNIFICANT CHANGE UP
TIBC SERPL-MCNC: 248 UG/DL — SIGNIFICANT CHANGE UP (ref 220–430)
UIBC SERPL-MCNC: 203 UG/DL — SIGNIFICANT CHANGE UP (ref 110–370)
UUN UR-MCNC: 403 MG/DL — SIGNIFICANT CHANGE UP
WBC # BLD: 6.83 K/UL — SIGNIFICANT CHANGE UP (ref 3.8–10.5)
WBC # BLD: 7.32 K/UL — SIGNIFICANT CHANGE UP (ref 3.8–10.5)
WBC # FLD AUTO: 6.83 K/UL — SIGNIFICANT CHANGE UP (ref 3.8–10.5)
WBC # FLD AUTO: 7.32 K/UL — SIGNIFICANT CHANGE UP (ref 3.8–10.5)

## 2025-04-19 PROCEDURE — 71045 X-RAY EXAM CHEST 1 VIEW: CPT | Mod: 26

## 2025-04-19 PROCEDURE — 99285 EMERGENCY DEPT VISIT HI MDM: CPT

## 2025-04-19 PROCEDURE — 99223 1ST HOSP IP/OBS HIGH 75: CPT | Mod: GC

## 2025-04-19 PROCEDURE — 99222 1ST HOSP IP/OBS MODERATE 55: CPT

## 2025-04-19 RX ORDER — DEXTROSE 50 % IN WATER 50 %
12.5 SYRINGE (ML) INTRAVENOUS ONCE
Refills: 0 | Status: DISCONTINUED | OUTPATIENT
Start: 2025-04-19 | End: 2025-04-21

## 2025-04-19 RX ORDER — ACETAMINOPHEN 500 MG/5ML
650 LIQUID (ML) ORAL EVERY 6 HOURS
Refills: 0 | Status: DISCONTINUED | OUTPATIENT
Start: 2025-04-19 | End: 2025-04-21

## 2025-04-19 RX ORDER — METFORMIN HYDROCHLORIDE 850 MG/1
1 TABLET ORAL
Refills: 0 | DISCHARGE

## 2025-04-19 RX ORDER — SODIUM CHLORIDE 9 G/1000ML
1000 INJECTION, SOLUTION INTRAVENOUS
Refills: 0 | Status: DISCONTINUED | OUTPATIENT
Start: 2025-04-19 | End: 2025-04-21

## 2025-04-19 RX ORDER — DEXTROSE 50 % IN WATER 50 %
25 SYRINGE (ML) INTRAVENOUS ONCE
Refills: 0 | Status: DISCONTINUED | OUTPATIENT
Start: 2025-04-19 | End: 2025-04-21

## 2025-04-19 RX ORDER — NICOTINE POLACRILEX 4 MG/1
1 GUM, CHEWING ORAL DAILY
Refills: 0 | Status: DISCONTINUED | OUTPATIENT
Start: 2025-04-19 | End: 2025-04-21

## 2025-04-19 RX ORDER — GLUCAGON 3 MG/1
1 POWDER NASAL ONCE
Refills: 0 | Status: DISCONTINUED | OUTPATIENT
Start: 2025-04-19 | End: 2025-04-21

## 2025-04-19 RX ORDER — DEXTROSE 50 % IN WATER 50 %
15 SYRINGE (ML) INTRAVENOUS ONCE
Refills: 0 | Status: DISCONTINUED | OUTPATIENT
Start: 2025-04-19 | End: 2025-04-21

## 2025-04-19 RX ORDER — INSULIN LISPRO 100 U/ML
INJECTION, SOLUTION INTRAVENOUS; SUBCUTANEOUS
Refills: 0 | Status: DISCONTINUED | OUTPATIENT
Start: 2025-04-19 | End: 2025-04-21

## 2025-04-19 RX ORDER — MELATONIN 5 MG
3 TABLET ORAL AT BEDTIME
Refills: 0 | Status: DISCONTINUED | OUTPATIENT
Start: 2025-04-19 | End: 2025-04-21

## 2025-04-19 RX ORDER — ONDANSETRON HCL/PF 4 MG/2 ML
4 VIAL (ML) INJECTION EVERY 8 HOURS
Refills: 0 | Status: DISCONTINUED | OUTPATIENT
Start: 2025-04-19 | End: 2025-04-21

## 2025-04-19 RX ORDER — INFLUENZA A VIRUS A/IDAHO/07/2018 (H1N1) ANTIGEN (MDCK CELL DERIVED, PROPIOLACTONE INACTIVATED, INFLUENZA A VIRUS A/INDIANA/08/2018 (H3N2) ANTIGEN (MDCK CELL DERIVED, PROPIOLACTONE INACTIVATED), INFLUENZA B VIRUS B/SINGAPORE/INFTT-16-0610/2016 ANTIGEN (MDCK CELL DERIVED, PROPIOLACTONE INACTIVATED), INFLUENZA B VIRUS B/IOWA/06/2017 ANTIGEN (MDCK CELL DERIVED, PROPIOLACTONE INACTIVATED) 15; 15; 15; 15 UG/.5ML; UG/.5ML; UG/.5ML; UG/.5ML
0.5 INJECTION, SUSPENSION INTRAMUSCULAR ONCE
Refills: 0 | Status: DISCONTINUED | OUTPATIENT
Start: 2025-04-19 | End: 2025-04-21

## 2025-04-19 RX ADMIN — NICOTINE POLACRILEX 1 PATCH: 4 GUM, CHEWING ORAL at 21:21

## 2025-04-19 RX ADMIN — Medication 1000 MILLILITER(S): at 13:51

## 2025-04-19 NOTE — H&P ADULT - PROBLEM SELECTOR PLAN 7
on methadone 55mg daily  clinic [facility] ([address])    - start methadone 40mg daily  - call clinic Monday to verify dose

## 2025-04-19 NOTE — ED PROVIDER NOTE - CLINICAL SUMMARY MEDICAL DECISION MAKING FREE TEXT BOX
68 yo M PMH [place], DM, HTN, Pulm HTN, CBD dilation, HCV (followed by hepatology in [place]), methadone program, admitted 9/23-9/28 for GI bleed due to multiple colonic AVMs, anemia (getting outpatient iron infusions) presents with lightheadedness and lower GI bleed this morning.  One hour or so ago, patient had a BM and noted a large amount of blood on the tissue, but not in the toilet or on the stool.  No abd pain, no cp, no sob, fever, cough.   Concern for recurrent bleed from AVM.  Will check labs, CTA. 68 yo M PMH [place], DM, HTN, Pulm HTN, CBD dilation, HCV (followed by hepatology in [place]), methadone program, admitted 9/23-9/28 for GI bleed due to multiple colonic AVMs, anemia (getting outpatient iron infusions) presents with lightheadedness and lower GI bleed this morning.  One hour or so ago, patient had a BM and noted a large amount of blood on the tissue, but not in the toilet or on the stool.  No abd pain, no cp, no sob, fever, cough.   Concern for recurrent bleed from AVM.  Will check labs, CTA if active bleeding.      2pm: Patient remains stable. No active bleeding.  Informed of plan for admission.  Has been accepted by [facility].  Surgical team to provide consult.

## 2025-04-19 NOTE — CONSULT NOTE ADULT - TIME BILLING
evaluation and management of recurrent lower GI bleed, colon AVMs, review of labs and imaging, discussion with consultant, documentation

## 2025-04-19 NOTE — H&P ADULT - PROBLEM SELECTOR PLAN 4
- hold home lisinopril 20mg TTE 9/24/2024: LVIDd 5.06, LVEF 61%. Mild LVH. Normal size and systolic function. Mid and apical anterior wall, mid anterolateral segment, and apical lateral segment are hypokinetic. Grade II DD. Dilated RV. Normal RV function. Biatrial enlargement. Aortic sclerosis w/o stenosis. Pulmonary HTN, PASP 47 mmHg    on home lisinopril 40mg daily, coreg 6.25mg BID, farxiga 10 daily, lasix 20mg daily (not adherent to lasix)    holding all meds pending GI recs

## 2025-04-19 NOTE — H&P ADULT - NSICDXFAMILYHX_GEN_ALL_CORE_FT
FAMILY HISTORY:  Father  Still living? No  FHx: myocardial infarction, Age at diagnosis: Age Unknown    Mother  Still living? No  Family history of cervical cancer, Age at diagnosis: Age Unknown

## 2025-04-19 NOTE — H&P ADULT - PROBLEM SELECTOR PLAN 10
hx of hepatitis C (completed tx 1 year ago as per patient)  follows hepatologist at [place]    - f/u outpatient

## 2025-04-19 NOTE — ED ADULT NURSE NOTE - NSFALLUNIVINTERV_ED_ALL_ED
Bed/Stretcher in lowest position, wheels locked, appropriate side rails in place/Call bell, personal items and telephone in reach/Instruct patient to call for assistance before getting out of bed/chair/stretcher/Non-slip footwear applied when patient is off stretcher/[place] to call system/Physically safe environment - no spills, clutter or unnecessary equipment/Purposeful proactive rounding/Room/bathroom lighting operational, light cord in reach

## 2025-04-19 NOTE — H&P ADULT - NSHPPHYSICALEXAM_GEN_ALL_CORE
T(C): 36.5 (04-19-25 @ 13:45), Max: 37.2 (04-19-25 @ 11:42)  HR: 66 (04-19-25 @ 13:45) (66 - 89)  BP: 152/66 (04-19-25 @ 13:45) (152/66 - 174/75)  RR: 18 (04-19-25 @ 13:45) (18 - 18)  SpO2: 98% (04-19-25 @ 13:45) (98% - 98%)    CONSTITUTIONAL: Well groomed, no apparent distress  EYES: PERRLA and symmetric, EOMI, No conjunctival or scleral injection, non-icteric  ENMT: Oral mucosa with moist membranes. Normal dentition; no pharyngeal injection or exudates             NECK: Supple, symmetric and without tracheal deviation   RESP: No respiratory distress, no use of accessory muscles; CTA b/l, no WRR  CV: RRR, +S1S2, no MRG; no JVD; no peripheral edema  GI: Soft, NT, ND, no rebound, no guarding; no palpable masses; no hepatosplenomegaly; no hernia palpated  LYMPH: No cervical LAD or tenderness; no axillary LAD or tenderness; no inguinal LAD or tenderness  MSK: Normal gait; No digital clubbing or cyanosis; examination of the (head/neck/spine/ribs/pelvis, RUE, LUE, RLE, LLE) without misalignment,            Normal ROM without pain, no spinal tenderness, normal muscle strength/tone  SKIN: No rashes or ulcers noted; no subcutaneous nodules or induration palpable  NEURO: CN II-XII intact; normal reflexes in upper and lower extremities, sensation intact in upper and lower extremities b/l to light touch   PSYCH: Appropriate insight/judgment; A+O x 3, mood and affect appropriate, recent/remote memory intact CONSTITUTIONAL: Well groomed, no apparent distress  EYES: PERRLA and symmetric, EOMI, No conjunctival or scleral injection, non-icteric  ENMT: Oral mucosa with moist membranes. Normal dentition; no pharyngeal injection or exudates             NECK: Supple, symmetric and without tracheal deviation   RESP: No respiratory distress, no use of accessory muscles; CTA b/l, no WRR  CV: RRR, +S1S2, no MRG; no JVD; no peripheral edema  GI: Soft, NT, ND, no rebound, no guarding; no palpable masses; no hepatosplenomegaly; no hernia palpated  MSK: Normal gait; No digital clubbing or cyanosis; examination of the (head/neck/spine/ribs/pelvis, RUE, LUE, RLE, LLE) without misalignment,            Normal ROM without pain, no spinal tenderness, normal muscle strength/tone    +BL 1+ pitting edema R>L  SKIN: No rashes or ulcers noted; no subcutaneous nodules or induration palpable  NEURO: CN II-XII intact; normal reflexes in upper and lower extremities, sensation intact in upper and lower extremities b/l to light touch   PSYCH: Appropriate insight/judgment; A+O x 3, mood and affect appropriate, recent/remote memory intact

## 2025-04-19 NOTE — H&P ADULT - PROBLEM SELECTOR PLAN 1
Pt presenting for episode of BRBPR day of admission. Of note, pt was recently admitted in Sept 2024 for anemia Hb 4, EGD/CN performed at that time showing multiple colonic AVMs. Suspect source of BRBPR is colonic AVMs. Noted to also have melena on NORBERT in ED and patient has elevated BUN - could be UGIB as well as LGIB. Patient currently hemodynamically stable, no c/f brisk bleed at this time.   - surgery consulted in ED, appreciate recs  - GI consult  - NPO pending possible intervention  - iv ppi bid Pt presenting for episode of BRBPR day of admission. Of note, pt was recently admitted in Sept 2024 for anemia Hb 4, EGD/CN performed at that time showing multiple colonic AVMs. Suspect source of BRBPR is colonic AVMs. Noted to also have melena on NORBERT in ED and patient has elevated BUN - could be UGIB as well as LGIB. Patient currently hemodynamically stable, no c/f brisk bleed at this time.     - surgery consulted in ED, appreciate recs  - GI consulted, recs appreciated  - NPO pending possible intervention

## 2025-04-19 NOTE — H&P ADULT - NSHPLABSRESULTS_GEN_ALL_CORE
.  LABS:                         7.7    6.83  )-----------( 236      ( 19 Apr 2025 12:22 )             23.2     04-19    137  |  101  |  39[H]  ----------------------------<  150[H]  4.7   |  22  |  2.16[H]    Ca    8.9      19 Apr 2025 12:22        Urinalysis Basic - ( 19 Apr 2025 12:22 )    Color: x / Appearance: x / SG: x / pH: x  Gluc: 150 mg/dL / Ketone: x  / Bili: x / Urobili: x   Blood: x / Protein: x / Nitrite: x   Leuk Esterase: x / RBC: x / WBC x   Sq Epi: x / Non Sq Epi: x / Bacteria: x            Lactate, Blood: 0.9 mmol/L (04-19 @ 12:22)      RADIOLOGY, EKG & ADDITIONAL TESTS: Reviewed. LABS:                         7.7    6.83  )-----------( 236      ( 19 Apr 2025 12:22 )             23.2     04-19    137  |  101  |  39[H]  ----------------------------<  150[H]  4.7   |  22  |  2.16[H]    Ca    8.9      19 Apr 2025 12:22        Urinalysis Basic - ( 19 Apr 2025 12:22 )    Color: x / Appearance: x / SG: x / pH: x  Gluc: 150 mg/dL / Ketone: x  / Bili: x / Urobili: x   Blood: x / Protein: x / Nitrite: x   Leuk Esterase: x / RBC: x / WBC x   Sq Epi: x / Non Sq Epi: x / Bacteria: x            Lactate, Blood: 0.9 mmol/L (04-19 @ 12:22)      RADIOLOGY, EKG & ADDITIONAL TESTS: Reviewed.

## 2025-04-19 NOTE — CONSULT NOTE ADULT - ATTENDING COMMENTS
Patient is a 69 year old gentleman with history of hypertension, Type 2 diabetes, substance use, anemia, recurrent lower GI bleed secondary to colonic AVMs, surgical history of open appendectomy who presents to ED with clinical, laboratory evidence of recurrent lower GI bleed. He had melanotic stool. Recently underwent endoscopy with GI and noted to have AVMs in cecum ascending colon and descending colon s/p APC and endoclips. At time of evaluation, afebrile, hemodynamically stable, abdomen soft, nontender, nondistended, no rebound or guarding. No plan for surgical intervention, medicine admission, GI consultation for possible colonoscopy, transfuse as required. Late entry, date of service 4/19/25.

## 2025-04-19 NOTE — H&P ADULT - PROBLEM SELECTOR PLAN 9
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     - holding home farxiga 10mg

## 2025-04-19 NOTE — H&P ADULT - ASSESSMENT
70 yo M PMH [place], HTN, CBD dilation, HCV (followed by hepatology in [place]), methadone program, emphysema, suspected Group II/III Pulm HTN, grade II diastolic dysfunction, current smoker admitted 9/23-9/28/24 for GI bleed due to multiple colonic AVMs, anemia (getting outpatient iron infusions) presents with lightheadedness and BRBPR, admitted for suspected GI bleed.

## 2025-04-19 NOTE — H&P ADULT - PROBLEM SELECTOR PLAN 2
Pt presenting after episode of BRBPR - found to have Hb 7.7 on admission. Baseline per chart review appears to be ~8. EGD/CN in Sept 2024 showing multiple colonic AVMs, believe this is the likely source of acute blood loss anemia. Pt was also found to have melena on ED examination so suspect could have bleeding from upper GI tract as well. Pt was found to be iron deficient on previous admission and was discharged on po iron but unable to tolerate so has been getting outpatient iv iron infusions.   - see above  - f/u iron panel  - transfuse for Hb < 7  - active T&S

## 2025-04-19 NOTE — ED PROVIDER NOTE - OBJECTIVE STATEMENT
70 yo M PMH [place], DM, HTN, Pulm HTN, CBD dilation, HCV (followed by hepatology in [place]), methadone program, admitted 9/23-9/28 for GI bleed due to multiple colonic AVMs, anemia (getting outpatient iron infusions) presents with lightheadedness and lower GI bleed this morning.  One hour or so ago, patient had a BM and noted a large amount of blood on the tissue, but not in the toilet or on the stool.  No abd pain, no cp, no sob, fever, cough.

## 2025-04-19 NOTE — ED ADULT NURSE NOTE - OBJECTIVE STATEMENT
68 y/o M hx DM, HTN, ashly addiction currently on Methadone, endorses x1 bloody stool episdoe this AM. Endorses no blood in toilet was seen but there was blood only when he wiped with toilet paper. Pt endorses similar episode 6 months ago, did not follow up with GI. Pt denies blood thinner use, abdominal pain, N/V/D, dizziness, lightheadedness, chest pain, SOB, vision changes. Pt A&Ox4, respirations even and unlabored, skin color WDL warm and dry, pt is ambulatory with a steady gait. No acute distress observed.

## 2025-04-19 NOTE — H&P ADULT - NSHPSOCIALHISTORY_GEN_ALL_CORE
Tob - current smoker  Alc -   Drugs -   Work - Tob - current smoker (25PY)  Alc - none  Drugs - former heroin user, quit 10y ago, on methadone

## 2025-04-19 NOTE — H&P ADULT - PROBLEM/PLAN-6
Problem: Mobility Impaired (Adult and Pediatric) Goal: *Acute Goals and Plan of Care (Insert Text) Description FUNCTIONAL STATUS PRIOR TO ADMISSION: Patient admitted from [facility]. Reports ambulation ~ 70 ft - 80 ft with a rolling walker and wheelchair follow. Reports x 2 falls at [facility]. Reports being able to sit self up at EOB for meals. HOME SUPPORT PRIOR TO ADMISSION: Patient resided at [facility] (though reports that he was to be discharged soon). Physical Therapy Goals Initiated 10/30/2019 1. Patient will move from supine to sit and sit to supine, scoot up and down and roll side to side in bed with modified independence within 7 day(s). 2.  Patient will transfer from bed to chair and chair to bed with minimal assistance/contact guard assist using the least restrictive device within 7 day(s). 3.  Patient will perform sit to stand with minimal assistance/contact guard assist within 7 day(s). 4.  Patient will ambulate with minimal assistance/contact guard assist for 50 feet with the least restrictive device within 7 day(s). 5.  Patient will ascend/descend 2 stairs with handrail(s) with minimal assistance/contact guard assist within 7 day(s). INFORMATION FROM 8/23/19 [facility] ADMISSION: 
Patient was modified independent using a Single point cane PRN for functional mobility. One major fall recently resulting in LOC and subsequent SDH. The patient lived alone with family/friends/and home care staff to provide assistance. Home care aide available for 33 hrs/week. Outcome: Progressing Towards Goal 
  
PHYSICAL THERAPY TREATMENT Patient: Nehemiah Burkett (64 y.o. male) Date: 11/5/2019 [facility] [A41.9, R65.21] <principal problem not specified> Precautions: Fall Chart, physical therapy assessment, plan of care and goals were reviewed. ASSESSMENT Patient continues with skilled PT services and is very slowly progressing DISPLAY PLAN FREE TEXT towards goals. Patient wishes to continue to participate in therapy sessions while admitted as he wants to maintain his \"quality of life. \" Patient participated in SLRs to fatigue (total: 6, 3 on each side), pull-to-sit/ \"modified crunches\" to fatigue (x 3), and EOB supported sitting (leaning to the right onto elevated HOB) x 8-9 minutes. Patient requires frequent and extensive seated rest breaks at this time. Unsafe to attempt standing 2* patient's quick fatigue onset, poor trunk control, and self-perceived \"dizziness. \" Will continue to follow pending patient's desire to participate. Current Level of Function Impacting Discharge (mobility/balance): Claudine lift OOB Other factors to consider for discharge: Hospice consult PLAN : 
Patient continues to benefit from skilled intervention to address the above impairments. Continue treatment per established plan of care. to address goals. Recommendation for discharge: (in order for the patient to meet his/her long term goals) To be determined: Noted Hospice consult This discharge recommendation: A follow-up discussion with the attending provider and/or case management is planned IF patient discharges home will need the following DME: to be determined (TBD) - If hospice chosen, hospice agency to set of DME SUBJECTIVE:  
Patient stated I'd like to sit up.  OBJECTIVE DATA SUMMARY:  
Critical Behavior: 
Neurologic State: Drowsy Orientation Level: Oriented X4 Cognition: Appropriate for age attention/concentration Safety/Judgement: Decreased insight into deficits Functional Mobility Training: 
Bed Mobility: 
  
Supine to Sit: Moderate assistance Sit to Supine: Maximum assistance Scooting: Moderate assistance(Seated) Balance: 
Sitting: Impaired; With support Sitting - Static: Poor (constant support) Pain Rating: 
Drive-line site pain Abdominal pain Activity Tolerance: Poor, desaturates with exertion and requires oxygen and requires frequent rest breaks Please refer to the flowsheet for vital signs taken during this treatment. After treatment patient left in no apparent distress:  
Supine in bed, Call bell within reach and Side rails x 3 
 
COMMUNICATION/COLLABORATION:  
The patients plan of care was discussed with: Occupational Therapist, Registered Nurse and  Maryan Galeazzi, PT, DPT Time Calculation: 31 mins

## 2025-04-19 NOTE — ED PROVIDER NOTE - PHYSICAL EXAMINATION
General:  Well appearing, no distress  HEENT:  No conjunctival injection, neck supple, no congestion   Chest:  Non-tender, no crepitance  Lungs:  Clear to auscultation bilaterally   Heart:  s1s2 normal, no murmur  Abdomen:  soft, non-tender, non-distended  :  Deferred  Rectal:  No hemorrhoids, non-tender, maroon, tarry stool on exam   Extremities: No edema, normal perfusion, no joint swelling or tenderness  Neuro:  Alert, conversant, motor/sensory grossly intact

## 2025-04-19 NOTE — CONSULT NOTE ADULT - SUBJECTIVE AND OBJECTIVE BOX
68yo Male pt with PMH    In the ED, pt afebrile, nontachycardic, normotensive, and satting on RA. On exam, _____. Labs significant for ______. CTAP showing _____.     PMH:  PSHx:  Medications:  Allergies:  Social Hx:  Family Hx: Denies family hx of IBS, Crohn's, UC, or colon cancer.  Last colonoscopy:  Last EGD:    T(C): 36.5 (04-19-25 @ 13:45), Max: 37.2 (04-19-25 @ 11:42)  HR: 66 (04-19-25 @ 13:45) (66 - 89)  BP: 152/66 (04-19-25 @ 13:45) (152/66 - 174/75)  RR: 18 (04-19-25 @ 13:45) (18 - 18)  SpO2: 98% (04-19-25 @ 13:45) (98% - 98%)    Physical Exam  General: AAOx3, NAD, laying comfortably in bed  Cardio: S1,S2, No MRG  Pulm: Nonlabored breathing  Abdomen:  Extremities: WWP, peripheral pulses appreciated      LABS:                        7.7    6.83  )-----------( 236      ( 19 Apr 2025 12:22 )             23.2     04-19    137  |  101  |  39[H]  ----------------------------<  150[H]  4.7   |  22  |  2.16[H]    Ca    8.9      19 Apr 2025 12:22              [age]yo Male pt with PMH of T2DM, HTN, substance use disorder, anemia, LGIB 2/2 colonic AVMs and PSHx of open appendectomy (>40yrs ago) presenting to [facility] ED with 1 episode of BRBPR on TP associated with melanotic stool. Patient was recently admitted to [facility] in Sept 2024 for symptomatic anemia (H/H 4/14.1) in the setting of melanotic stool. During that admission, EGD/Cscope were done with findings of  nonerosive gastritis and multiple AVMs in the cecum, ascending colon, and descending colon. APC and endoclips were applied at these areas and patient was told to follow up for outpatient repeat egd/cscope in 6mo but was lost to follow up. Since previous admission, patient denies any other occurrences of blood per rectum and reports consistent outpt follow up with hematology for iron deficiency and has received 4 iron transfusions in the past month. Patient currently denies pain, nausea, emesis, fever/chills, SOB, lightheadedness, dizziness, or any symptoms at this time. In the ED, pt afebrile, nontachycardic, normotensive, and satting on RA. On exam, abdomen is soft, NT, ND without rebound or guarding. Rectal exam showing no masses or hemorrhoids appreciated, NORBERT+ for melena, intact sphincter, no masses palpated. Labs significant for Hgb 7.7 and BUN/Cr 39/2.16 (per outpatient records, baseline 23/1.4). General surgery was consulted for melena.    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    Last colonoscopy Sept 2024:  -Stool was seen in the whole colon. There was limited ability to visualize lesions <1 cm.	  -Normal mucosa was noted in the terminal ileum.	  -Two AVMs were noted in the cecum and descending colon and treated with argon plasma coagulation (APC).	  -An additional AVM was noted in the distal ascending colon and treated with argon plasma coagulation (APC). 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 Two endoclips were successfully applied for the purpose of hemostasis.    Last EGD Sept 2024:  -Normal esophagus.  -Diffuse erythema of the mucosa with no bleeding was noted in the stomach. These findings are compatible with non-erosive gastritis. Multiple cold forceps biopsies were performed for histology.  -Patchy white areas of mucosa resembling lymphangiectasias were seen along the greater curvature and the pre-pyloric region.. Multiple cold forceps biopsies were performed for histology.  -Normal mucosa was noted in the whole examined duodenum. Random mucosal biopsies were obtained to evaluate for histologic features of celiac disease. Multiple cold forceps biopsies were performed for histology in the second part of the duodenum.    T(C): 36.5 (04-19-25 @ 13:45), Max: 37.2 (04-19-25 @ 11:42)  HR: 66 (04-19-25 @ 13:45) (66 - 89)  BP: 152/66 (04-19-25 @ 13:45) (152/66 - 174/75)  RR: 18 (04-19-25 @ 13:45) (18 - 18)  SpO2: 98% (04-19-25 @ 13:45) (98% - 98%)    Physical Exam  General: AAOx3, NAD, laying comfortably in bed  Cardio: S1,S2, No MRG  Pulm: Nonlabored breathing  Abdomen: soft, NT, ND without rebound or guarding  Rectal: no masses or hemorrhoids appreciated, NORBERT+ for melena, intact sphincter, no masses palpated  Extremities: WWP, peripheral pulses appreciated      LABS:                        7.7    6.83  )-----------( 236      ( 19 Apr 2025 12:22 )             23.2     04-19    137  |  101  |  39[H]  ----------------------------<  150[H]  4.7   |  22  |  2.16[H]    Ca    8.9      19 Apr 2025 12:22              69y

## 2025-04-19 NOTE — H&P ADULT - NSICDXPASTMEDICALHX_GEN_ALL_CORE_FT
PAST MEDICAL HISTORY:  Encounter for long-term methadone use for opiate dependence     Hypertension     Type 2 diabetes mellitus

## 2025-04-19 NOTE — CONSULT NOTE ADULT - ASSESSMENT
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 Patient was recently admitted to [facility] in Sept 2024 for symptomatic anemia (H/H 4/14.1) in the setting of melanotic stool. During that admission, EGD/Cscope were done with findings of  nonerosive gastritis and multiple AVMs in the cecum, ascending colon, and descending colon. APC and endoclips were applied at these areas. Since previous admission, patient denies any other occurences of blood per rectum and reports consistent outpt follow up with hematology for iron deficiency and has received 4 iron transfusions in the past month.  Rectal exam showing no masses or hemorrhoids appreciated, NORBERT+ for melena, intact sphincter, no masses palpated. General surgery was consulted for melena.    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 Patient was recently admitted to [facility] in Sept 2024 for symptomatic anemia (H/H 4/14.1) in the setting of melanotic stool. During that admission, EGD/Cscope were done with findings of  nonerosive gastritis and multiple AVMs in the cecum, ascending colon, and descending colon. APC and endoclips were applied at these areas. Since previous admission, patient denies any other occurences of blood per rectum and reports consistent outpt follow up with hematology for iron deficiency and has received 4 iron transfusions in the past month.  Rectal exam showing no masses or hemorrhoids appreciated, NORBERT+ for melena, intact sphincter, no masses palpated. General surgery was consulted for melena.    Recommendations:  No acute surgical intervention at this time  Given recent known history of anemia 2/2 AVMs, this is likely source of melena  Recommend medicine admission for further evaluation and management  Recommend GI consult for possible repeat scope  Transfuse for Hgb <7 prn  Team 4C will continue to follow at this time    Plan discussed with chief resident and attending

## 2025-04-19 NOTE — H&P ADULT - HISTORY OF PRESENT ILLNESS
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  One hour or so ago, patient had a BM and noted a large amount of blood on the tissue, but not in the toilet or on the stool.  No abd pain, no cp, no sob, fever, cough.    Of note, pt was admitted in Sept 2024 for anemia Hb 4, EGD/CN performed and was found to have multiple colonic AVMs thought to be the source of bleeding.     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  Few hours ago, patient had a BM and noted a large amount of blood (light pink in color) on the tissue, but not in the toilet or on the stool. Since his discharge, this is his first recurrent episode of blood in stool/toilet paper. He states he is also more lightheaded today. Otherwise, he has been feeling overall well. No fever, chills, N/V/D, abdominal pain, heartburn, recent weight changes. His appetite is good. Of note, pt was admitted in Sept 2024 for symptomatic anemia Hb 4, EGD/CN performed and was found to have multiple colonic AVMs thought to be the source of bleeding s/p endoclips. He followed up with GI outpatient who recommended 6 month follow-up colonoscopy and EGD (biopsy with intestinal metaplasia). He followed up with heme for iron deficiency and has received 4 iron transfusions in the past month.     PCP: Dr. Janey Angeles   GI/Avinash/Cards at [facility]    ED course:  Vitals: T99, HR 89, /75, RR 18 SpO2 95% on RA  Labs sig for: Hb 7.7 (baseline ~8), BUN 39, Cr 2.16 (baseline ~1.5), lactate 0.9  Intervention: 1L NS  EKG: pending

## 2025-04-19 NOTE — H&P ADULT - PROBLEM SELECTOR PLAN 3
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   TTE   - holding home farxiga 10mg On admission, BUN/Cr 39/ 2.16 (baseline Cr 1.5)  likely secondary to hypoperfusion     Plan:  - trend Cr  - avoid nephrotoxic drugs, renally dose meds  - urine studies

## 2025-04-19 NOTE — ED ADULT TRIAGE NOTE - CHIEF COMPLAINT QUOTE
Pt reports having bloody stool this morning x 1 time. Denies abdominal pain, n/v, blood thinner use, sob, dizziness.

## 2025-04-19 NOTE — H&P ADULT - ATTENDING COMMENTS
CC: BRBPR  S:  Patient presents for dizziness and had BRBPR this morning.  He noted blood on the toilet paper.  Denies CP, SOB, abdominal pan, N/V.  O:  Vital Signs Last 24 Hrs  T(C): 36.5 (04-19-25 @ 13:45), Max: 37.2 (04-19-25 @ 11:42)  T(F): 97.7 (04-19-25 @ 13:45), Max: 99 (04-19-25 @ 11:42)  HR: 66 (04-19-25 @ 13:45) (66 - 89)  BP: 152/66 (04-19-25 @ 13:45) (152/66 - 174/75)  RR: 18 (04-19-25 @ 13:45) (18 - 18)  SpO2: 98% (04-19-25 @ 13:45) (98% - 98%)  PE:  Gen: Oriented, alert, No acute distress Eyes: conjunctivae and lid normal, sclera clear with no icterus ENT: nose and throat exam normal CVS: S1, S2, RRR; no murmur, no rubs, no gallops Pulm: Good air exchange, Breath sounds equal bilaterally, no rales rhonchi,  no wheezes Chest: nontender, no chest deformity, chest movement symmetrical Gl: abdomen soft, nontender, nondistended, bowel sounds normoactive, no masses palpated Musk: no msk pain,1+ pitting edema BLE Skin: no skin lesions, skin turgor normal, warm and well perfused Neuro: Awake, alert,Psych: normal affect, insight      A/P:  Mr. Archie Adams is a 70 yo man with PMH significant for DM, HTN, pulm HTN, CBD dilation, HCV, on MMTP hx of multiple colonic AVMs, anemia, Hx of GIB who presents with lightheadedness and BRBPR admitted for GIB.    #BRBPR  #Possible Lower GI Bleed  #Anemia  #Colonic AVMs  #DM  #HTN  #Pulm HTN  #CBD dilation  #HCV  #MMTP  #Tobacco use disorder  #ISABEL  -monitor H/H, transfuse for hgb<7  -avoid NSAIDs, blood thinners  -obtain iron panel  -surgery consulted, no acute surgical intervention  -NPO  -PPI  -IV fluids  -GI consult  -complete med rec  -monitor BP  -ISS  -hold BP meds for now  -cont methadone  -start nicotine patch  -check urine studies  -DVT ppx: SCD    Labs reviewed:                        7.7    6.83  )-----------( 236      ( 19 Apr 2025 12:22 )             23.2  04-19    137  |  101  |  39[H]  ----------------------------<  150[H]  4.7   |  22  |  2.16[H]    Ca    8.9      19 Apr 2025 12:22      04-19    137  |  101  |  39[H]  ----------------------------<  150[H]  4.7   |  22  |  2.16[H]    Ca    8.9      19 Apr 2025 12:22        Total Time Spent 75 minutes  This includes chart review, patient assessment, discussion and collaboration with interdisciplinary team members.

## 2025-04-19 NOTE — H&P ADULT - PROBLEM SELECTOR PLAN 11
F: s/p 1L NS  E: replete as needed  N: NPO for now  DVT ppx: hold iso anemia  Dispo: [facility]

## 2025-04-20 DIAGNOSIS — D64.9 ANEMIA, UNSPECIFIED: ICD-10-CM

## 2025-04-20 DIAGNOSIS — K92.1 MELENA: ICD-10-CM

## 2025-04-20 LAB
APTT BLD: 34 SEC — SIGNIFICANT CHANGE UP (ref 24.5–35.6)
INR BLD: 1.17 — HIGH (ref 0.85–1.16)
PROTHROM AB SERPL-ACNC: 13.4 SEC — SIGNIFICANT CHANGE UP (ref 9.9–13.4)

## 2025-04-20 PROCEDURE — 99233 SBSQ HOSP IP/OBS HIGH 50: CPT | Mod: GC

## 2025-04-20 PROCEDURE — 99232 SBSQ HOSP IP/OBS MODERATE 35: CPT

## 2025-04-20 RX ORDER — AMLODIPINE BESYLATE 10 MG/1
10 TABLET ORAL ONCE
Refills: 0 | Status: COMPLETED | OUTPATIENT
Start: 2025-04-20 | End: 2025-04-20

## 2025-04-20 RX ORDER — LISINOPRIL 5 MG/1
20 TABLET ORAL DAILY
Refills: 0 | Status: DISCONTINUED | OUTPATIENT
Start: 2025-04-20 | End: 2025-04-21

## 2025-04-20 RX ORDER — FUROSEMIDE 10 MG/ML
20 INJECTION INTRAMUSCULAR; INTRAVENOUS ONCE
Refills: 0 | Status: COMPLETED | OUTPATIENT
Start: 2025-04-20 | End: 2025-04-20

## 2025-04-20 RX ORDER — CEPHALEXIN 250 MG/1
1 CAPSULE ORAL
Refills: 0
Start: 2025-04-20

## 2025-04-20 RX ORDER — CARVEDILOL 3.12 MG/1
6.25 TABLET, FILM COATED ORAL EVERY 12 HOURS
Refills: 0 | Status: DISCONTINUED | OUTPATIENT
Start: 2025-04-20 | End: 2025-04-21

## 2025-04-20 RX ORDER — METHADONE HCL 10 MG
40 TABLET ORAL ONCE
Refills: 0 | Status: DISCONTINUED | OUTPATIENT
Start: 2025-04-20 | End: 2025-04-20

## 2025-04-20 RX ADMIN — CARVEDILOL 6.25 MILLIGRAM(S): 3.12 TABLET, FILM COATED ORAL at 11:01

## 2025-04-20 RX ADMIN — AMLODIPINE BESYLATE 10 MILLIGRAM(S): 10 TABLET ORAL at 11:51

## 2025-04-20 RX ADMIN — NICOTINE POLACRILEX 1 PATCH: 4 GUM, CHEWING ORAL at 06:03

## 2025-04-20 RX ADMIN — LISINOPRIL 20 MILLIGRAM(S): 5 TABLET ORAL at 11:01

## 2025-04-20 RX ADMIN — NICOTINE POLACRILEX 1 PATCH: 4 GUM, CHEWING ORAL at 11:01

## 2025-04-20 RX ADMIN — CARVEDILOL 6.25 MILLIGRAM(S): 3.12 TABLET, FILM COATED ORAL at 22:05

## 2025-04-20 RX ADMIN — NICOTINE POLACRILEX 1 PATCH: 4 GUM, CHEWING ORAL at 22:16

## 2025-04-20 RX ADMIN — NICOTINE POLACRILEX 1 PATCH: 4 GUM, CHEWING ORAL at 18:11

## 2025-04-20 RX ADMIN — INSULIN LISPRO 2: 100 INJECTION, SOLUTION INTRAVENOUS; SUBCUTANEOUS at 09:00

## 2025-04-20 RX ADMIN — FUROSEMIDE 20 MILLIGRAM(S): 10 INJECTION INTRAMUSCULAR; INTRAVENOUS at 15:38

## 2025-04-20 RX ADMIN — Medication 40 MILLIGRAM(S): at 21:00

## 2025-04-20 NOTE — PROGRESS NOTE ADULT - PROBLEM SELECTOR PLAN 7
Plan:  F: Encourage PO intake  E: replete K<4, Mg<2  N: clear liquids  VTE Prophylaxis: None, held ISO possible GI bleed  GI: protonix 40IV BID  C: Full Code  D: JUANA -restarting home lisinopril 40mg daily and amlodipine 10mg daily, coreg 6.25mg BID, later will restart lasix 20mg PO daily

## 2025-04-20 NOTE — CONSULT NOTE ADULT - SUBJECTIVE AND OBJECTIVE BOX
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     Patient felt well from last hospitalization until yesterday when he was having a BM and noticed that he had red blood on the toilet paper but not in stool or in toilet bowl. No similar episodes in the past few months. No rectal pain or abdominal pain with BM. No nausea or vomiting. No fever, chills, cough, SOB, dysuria, urinary frequency/urgency. no recent medication changes. No NSAID use. Has had no further BMs since admission. Patient typically has a BM once daily but they do feel hard occasionally. Patient never followed up with GI after his last hospitalization.     PCP: Dr. Janey Angeles   GI/Heme/Cards at [facility]    ED course:  Vitals: T99, HR 89, /75, RR 18 SpO2 95% on RA  Labs sig for: Hb 7.7 (baseline ~8), BUN 39, Cr 2.16 (baseline ~1.5), lactate 0.9  Intervention: 1L NS  EKG: pending   (19 Apr 2025 13:59)    Allergies    No Known Allergies    Intolerances      Home Medications:  amLODIPine 10 mg oral tablet: 1 tab(s) orally once a day (19 Apr 2025 15:23)  Coreg 6.25 mg oral tablet: 1 tab(s) orally 2 times a day (19 Apr 2025 15:21)  lisinopril 20 mg oral tablet: 1 tab(s) orally once a day (19 Apr 2025 15:26)  metFORMIN 1000 mg oral tablet: 1 tab(s) orally 2 times a day (19 Apr 2025 15:24)  methadone 10 mg/mL oral concentrate: 5.5 milliliter(s) orally every 24 hours (28 Sep 2024 10:13)    MEDICATIONS:  MEDICATIONS  (STANDING):  carvedilol 6.25 milliGRAM(s) Oral every 12 hours  dextrose 5%. 1000 milliLiter(s) (100 mL/Hr) IV Continuous <Continuous>  dextrose 5%. 1000 milliLiter(s) (50 mL/Hr) IV Continuous <Continuous>  dextrose 50% Injectable 25 Gram(s) IV Push once  dextrose 50% Injectable 12.5 Gram(s) IV Push once  dextrose 50% Injectable 25 Gram(s) IV Push once  glucagon  Injectable 1 milliGRAM(s) IntraMuscular once  influenza  Vaccine (HIGH DOSE) 0.5 milliLiter(s) IntraMuscular once  insulin lispro (ADMELOG) corrective regimen sliding scale   SubCutaneous three times a day before meals  lisinopril 20 milliGRAM(s) Oral daily  nicotine -   7 mG/24Hr(s) Patch 1 Patch Transdermal daily    MEDICATIONS  (PRN):  acetaminophen     Tablet .. 650 milliGRAM(s) Oral every 6 hours PRN Temp greater or equal to 38C (100.4F), Mild Pain (1 - 3)  dextrose Oral Gel 15 Gram(s) Oral once PRN Blood Glucose LESS THAN 70 milliGRAM(s)/deciliter  melatonin 3 milliGRAM(s) Oral at bedtime PRN Insomnia  ondansetron Injectable 4 milliGRAM(s) IV Push every 8 hours PRN Nausea and/or Vomiting    PAST MEDICAL & SURGICAL HISTORY:  Type 2 diabetes mellitus      Hypertension      Encounter for long-term methadone use for opiate dependence      History of appendectomy        FAMILY HISTORY:  FHx: myocardial infarction (Father)    Family history of cervical cancer (Mother)      SOCIAL HISTORY:  Tobacco: [ ] Current, [ ] Former, [ ] Never; Pack Years:  Alcohol:  Illicit Drugs:    REVIEW OF SYSTEMS:  All other 10 review of systems is negative unless indicated above.    Vital Signs Last 24 Hrs  T(C): 36.9 (20 Apr 2025 11:00), Max: 37 (19 Apr 2025 15:00)  T(F): 98.5 (20 Apr 2025 11:00), Max: 98.6 (19 Apr 2025 15:00)  HR: 61 (20 Apr 2025 13:32) (60 - 69)  BP: 166/75 (20 Apr 2025 13:32) (152/73 - 196/87)  BP(mean): --  RR: 17 (20 Apr 2025 11:00) (17 - 18)  SpO2: 99% (20 Apr 2025 11:00) (95% - 99%)    Parameters below as of 20 Apr 2025 11:00  Patient On (Oxygen Delivery Method): room air        04-19 @ 07:01  -  04-20 @ 07:00  --------------------------------------------------------  IN: 0 mL / OUT: 700 mL / NET: -700 mL        PHYSICAL EXAM:    General: lying in bed, in no acute distress  HEENT: Neck supple, mmm, no jvd  Lungs: Normal respiratory effort, no intercostal retractions  Cardiovascular: regular rate  Abdomen: Soft, non-tender non-distended; No rebound or guarding  Neurological: SILVA, speech fluent  Skin: Warm and dry. No obvious rash  Rectal: Normal tone, hard brown stool     LABS:                        9.0    6.33  )-----------( 268      ( 20 Apr 2025 09:00 )             27.6     04-20    140  |  104  |  30[H]  ----------------------------<  161[H]  4.7   |  22  |  1.96[H]    Ca    9.2      20 Apr 2025 09:00  Phos  3.5     04-20  Mg     2.0     04-20    TPro  7.8  /  Alb  3.9  /  TBili  0.3  /  DBili  x   /  AST  17  /  ALT  15  /  AlkPhos  143[H]  04-20        PT/INR - ( 20 Apr 2025 09:00 )   PT: 13.4 sec;   INR: 1.17          PTT - ( 20 Apr 2025 09:00 )  PTT:34.0 sec    RADIOLOGY & ADDITIONAL STUDIES:     Reviewed

## 2025-04-20 NOTE — CONSULT NOTE ADULT - ASSESSMENT
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     Hgb baseline appears to be around 7-8 and patient's Hgb was 7.7 on admission. He is hemodynamically stable with no tachycardia and has been hypertensive during admission. Hard brown stool on rectal exam suggestive of constipation. Most likely that patient had hard stool leading to rectal trauma and small amount of rectal bleeding that has now resolved. Patient is due for CRC screening as his last colonoscopy had poor prep but it is reasonable to perform outpatient given patient's clinical stability and patient's preference to go home.     Recommendations:  - Start bowel regimen for constipation with miralax daily  - Trend CBC, maintain active T&S  - Pt should have outpatient GI follow-up w/ Dr. Castle at [address] [place] [address], [address], [place] [address] (phone: [phone]). Please include this information in DC paperwork. Will discuss timing of colonoscopy at that time.     Case discussed w/ GI attending Dr. Tom Castle MD  PGY-4 GI Fellow  GI consult pager (M-F 7e-2p): [phone]  Please call  for on-call fellow after hours

## 2025-04-20 NOTE — PROGRESS NOTE ADULT - PROBLEM SELECTOR PLAN 2
Patient with new O2 requirement 2L NC and SOB on exertion. Ddx ADHF vs COPD/emphysema. Patient with no known history of heart failure, currently not on any diuretic or GDMT medication for this, however, presenting with dyspnea on exertion, bilateral lower leg swelling x1 month. ProBNP 2537. Trace bilateral pleural effusions seen on imaging. Smokes 15 cigarette daily. TTE showing hypokinetic LV with EF 55%, pulmonary HTN PASP 47, BL atrial enlargement  - Per cardiology, continued with lisinopril 20, started spironolactone 25mg daily, SGLT2i sent to vivo  - daily weights, strict I/O On admission, BUN/Cr 39/ 2.16 (baseline Cr 1.5), likely secondary to hypoperfusion   FENa >3.5%, FeUrea >35%  Intrinsic renal injury, likely from Farxiga nephrotoxicity, less likely from low blood volumes, improving after fluids.     Plan:  - trend Cr  - avoid nephrotoxic drugs, renally dose medications  - avoid labile pressures  - hold farxiga on dc

## 2025-04-20 NOTE — PROGRESS NOTE ADULT - PROBLEM SELECTOR PLAN 3
Patient noted to have ISABEL with Cr 1.40 over baseline 0.89 (2021). Likely pre-renal etiology 2/2 fluid hypervolemia 2/2 acute decompensated heart failure. s/p 3 u pRBCs  - Cr uptrending to 1.59 today  - Encourage PO intake  - strict I/O, trend Cr daily  - avoid nephrotoxic drugs, renally dose meds TTE 9/24/2024: LVIDd 5.06, LVEF 61%. Mild LVH. Normal size and systolic function. Mid and apical anterior wall, mid anterolateral segment, and apical lateral segment are hypokinetic. Grade II DD. Dilated RV. Normal RV function. Biatrial enlargement. Aortic sclerosis w/o stenosis. Pulmonary HTN, PASP 47 mmHg    on home lisinopril 40mg daily, coreg 6.25mg BID, farxiga 10 daily, lasix 20mg daily (not adherent to lasix)  On admission, home medications were held due to concerns of GIB.   Restarted 4/20.     -Restarting remaining home medications as well except farxiga due to ISABEL on CKD

## 2025-04-20 NOTE — PROGRESS NOTE ADULT - PROBLEM SELECTOR PLAN 4
Intra/extra-hepatic biliary ductal dilation seen on CT imaging. Patient apparently following with hepatology annually for Hep C treated in past, no diagnosed history of other liver disease, no stones seen on imaging. Possibly obstruction iso malignancy with mets to liver.  - MRCP showing mild intra and extrahepatic ductal dilatation. No choledocholithiasis or mass lesion. No evidence of an ampullary or pancreatic head mass  - f/u surgery recs Hb 7.7 on admission. Baseline per chart review appears to be ~8. EGD/CN in Sept 2024 showing multiple colonic AVMs, believe this is the likely source of acute blood loss anemia. Pt was also found to have melena on ED examination so suspect could have bleeding from upper GI tract as well. Pt was found to be iron deficient on previous admission and was discharged on po iron but unable to tolerate so has been getting outpatient iv iron infusions.   Iron panel DILIA  - see above  - transfuse for Hb < 7  - active T&S.

## 2025-04-20 NOTE — PROGRESS NOTE ADULT - PROBLEM SELECTOR PLAN 1
Patient presenting with 1 month of SOB, melena/hematochezia. Hgb on admission 4, MCV 72.7. Currently no signs of active bleeding (no hematochezia, melena, hemoptysis, hematuria). S/p 2u PRBC. Iron panel suggestive of iron deficiency anemia. Given weight loss over past year and anemia of unknown etiology, c/f possible malignancy.  - s/p dual EGD/cscope 9/27   - monitor Hgb o/n per GI recs  - IV Protonix BID  - Hgb stable at 8.3; trend CBC, transfuse if Hgb <7  - maintain active T&S CC episode of BRBPR day of admission.   Sept 2024 admission for anemia Hb 4, EGD/CN performed at that time showing multiple colonic AVMs.   Suspected source of BRBPR is colonic AVMs again.   Noted to also have melena on NORBERT in ED and patient has elevated BUN - likely from renal injury, less likely from GIB, no c/f brisk bleed at this time.     GI final recommendations for outpatient scope, patient agrees with plan, no acute need for inpatient eval/intervention as he is hemodynamically showing no consistent signs of acute bleed given hypertension history explaining rise in BP after holding of patient's home medications, without tachycardia during entire hospitalization, and improved to baseline Hb without transfusions, no melena/BR per stool since admission to [facility].     - surgery consulted in ED, no intervention  - GI consulted, recs appreciated, no intervention  - outpatient followup

## 2025-04-20 NOTE — PROGRESS NOTE ADULT - PROBLEM SELECTOR PLAN 6
Pt has hx of Type 2 DM. On home metformin 1000mg.   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     - holding home farxiga 10mg.

## 2025-04-20 NOTE — PROGRESS NOTE ADULT - PROBLEM SELECTOR PLAN 5
Pt w/ Hx of HTN managed w/ lisinopril 20mg QD. BP elevated while inpatient, patient reports he took home med this morning.   -c/w home med  -Started spironolactone 25mg daily 09/2024: Intra/extra-hepatic biliary ductal dilation seen on CT imaging. Patient apparently following with hepatology annually for Hep C treated in past, no diagnosed history of other liver disease, no stones seen on imaging. Possibly obstruction iso malignancy with mets to liver.  09/2024 MRCP showing mild intra and extrahepatic ductal dilatation. No choledocholithiasis or mass lesion. No evidence of an ampullary or pancreatic head mass  As per GI, possibly secondary to opiod use    f/u outpatient.

## 2025-04-21 ENCOUNTER — TRANSCRIPTION ENCOUNTER (OUTPATIENT)
Age: 70
End: 2025-04-21

## 2025-04-21 VITALS — SYSTOLIC BLOOD PRESSURE: 133 MMHG | DIASTOLIC BLOOD PRESSURE: 73 MMHG

## 2025-04-21 LAB
BILIRUB SERPL-MCNC: 0.2 MG/DL — SIGNIFICANT CHANGE UP (ref 0.2–1.2)
CREAT SERPL-MCNC: 2.23 MG/DL — HIGH (ref 0.5–1.3)
EGFR: 32 ML/MIN/1.73M2 — LOW
EGFR: 32 ML/MIN/1.73M2 — LOW
INR BLD: 1.13 — SIGNIFICANT CHANGE UP (ref 0.85–1.16)
MELD SCORE WITH DIALYSIS: 21 POINTS — SIGNIFICANT CHANGE UP
MELD SCORE WITHOUT DIALYSIS: 15 POINTS — SIGNIFICANT CHANGE UP
PROTHROM AB SERPL-ACNC: 13 SEC — SIGNIFICANT CHANGE UP (ref 9.9–13.4)
SODIUM SERPL-SCNC: 139 MMOL/L — SIGNIFICANT CHANGE UP (ref 135–145)

## 2025-04-21 PROCEDURE — 99285 EMERGENCY DEPT VISIT HI MDM: CPT | Mod: 25

## 2025-04-21 PROCEDURE — 84540 ASSAY OF URINE/UREA-N: CPT

## 2025-04-21 PROCEDURE — 99222 1ST HOSP IP/OBS MODERATE 55: CPT | Mod: GC

## 2025-04-21 PROCEDURE — 86901 BLOOD TYPING SEROLOGIC RH(D): CPT

## 2025-04-21 PROCEDURE — 99239 HOSP IP/OBS DSCHRG MGMT >30: CPT | Mod: GC

## 2025-04-21 PROCEDURE — 86850 RBC ANTIBODY SCREEN: CPT

## 2025-04-21 PROCEDURE — 84100 ASSAY OF PHOSPHORUS: CPT

## 2025-04-21 PROCEDURE — 82728 ASSAY OF FERRITIN: CPT

## 2025-04-21 PROCEDURE — 82962 GLUCOSE BLOOD TEST: CPT

## 2025-04-21 PROCEDURE — 85025 COMPLETE CBC W/AUTO DIFF WBC: CPT

## 2025-04-21 PROCEDURE — 80053 COMPREHEN METABOLIC PANEL: CPT

## 2025-04-21 PROCEDURE — 82570 ASSAY OF URINE CREATININE: CPT

## 2025-04-21 PROCEDURE — 71045 X-RAY EXAM CHEST 1 VIEW: CPT

## 2025-04-21 PROCEDURE — 85027 COMPLETE CBC AUTOMATED: CPT

## 2025-04-21 PROCEDURE — 83605 ASSAY OF LACTIC ACID: CPT

## 2025-04-21 PROCEDURE — 86900 BLOOD TYPING SEROLOGIC ABO: CPT

## 2025-04-21 PROCEDURE — 83540 ASSAY OF IRON: CPT

## 2025-04-21 PROCEDURE — 83735 ASSAY OF MAGNESIUM: CPT

## 2025-04-21 PROCEDURE — 83036 HEMOGLOBIN GLYCOSYLATED A1C: CPT

## 2025-04-21 PROCEDURE — 82565 ASSAY OF CREATININE: CPT

## 2025-04-21 PROCEDURE — 84295 ASSAY OF SERUM SODIUM: CPT

## 2025-04-21 PROCEDURE — 85610 PROTHROMBIN TIME: CPT

## 2025-04-21 PROCEDURE — 82247 BILIRUBIN TOTAL: CPT

## 2025-04-21 PROCEDURE — 84156 ASSAY OF PROTEIN URINE: CPT

## 2025-04-21 PROCEDURE — 85730 THROMBOPLASTIN TIME PARTIAL: CPT

## 2025-04-21 PROCEDURE — 80048 BASIC METABOLIC PNL TOTAL CA: CPT

## 2025-04-21 PROCEDURE — 83550 IRON BINDING TEST: CPT

## 2025-04-21 PROCEDURE — 36415 COLL VENOUS BLD VENIPUNCTURE: CPT

## 2025-04-21 PROCEDURE — 84300 ASSAY OF URINE SODIUM: CPT

## 2025-04-21 RX ORDER — LISINOPRIL 5 MG/1
1 TABLET ORAL
Refills: 0 | DISCHARGE

## 2025-04-21 RX ORDER — FUROSEMIDE 10 MG/ML
1 INJECTION INTRAMUSCULAR; INTRAVENOUS
Qty: 30 | Refills: 0
Start: 2025-04-21 | End: 2025-05-20

## 2025-04-21 RX ORDER — LISINOPRIL 5 MG/1
1 TABLET ORAL
Qty: 30 | Refills: 0
Start: 2025-04-21 | End: 2025-05-20

## 2025-04-21 RX ORDER — METFORMIN HYDROCHLORIDE 850 MG/1
1 TABLET ORAL
Qty: 60 | Refills: 0
Start: 2025-04-21 | End: 2025-05-20

## 2025-04-21 RX ORDER — CARVEDILOL 3.12 MG/1
1 TABLET, FILM COATED ORAL
Qty: 60 | Refills: 0
Start: 2025-04-21 | End: 2025-05-20

## 2025-04-21 RX ORDER — CARVEDILOL 3.12 MG/1
1 TABLET, FILM COATED ORAL
Refills: 0 | DISCHARGE

## 2025-04-21 RX ORDER — AMLODIPINE BESYLATE 10 MG/1
10 TABLET ORAL DAILY
Refills: 0 | Status: DISCONTINUED | OUTPATIENT
Start: 2025-04-21 | End: 2025-04-21

## 2025-04-21 RX ORDER — AMLODIPINE BESYLATE 10 MG/1
1 TABLET ORAL
Refills: 0 | DISCHARGE

## 2025-04-21 RX ORDER — AMLODIPINE BESYLATE 10 MG/1
1 TABLET ORAL
Qty: 30 | Refills: 0
Start: 2025-04-21 | End: 2025-05-20

## 2025-04-21 RX ORDER — METFORMIN HYDROCHLORIDE 850 MG/1
1 TABLET ORAL
Refills: 0 | DISCHARGE

## 2025-04-21 RX ORDER — FUROSEMIDE 10 MG/ML
20 INJECTION INTRAMUSCULAR; INTRAVENOUS DAILY
Refills: 0 | Status: DISCONTINUED | OUTPATIENT
Start: 2025-04-21 | End: 2025-04-21

## 2025-04-21 RX ORDER — DAPAGLIFLOZIN 5 MG/1
1 TABLET, FILM COATED ORAL
Qty: 30 | Refills: 0
Start: 2025-04-21 | End: 2025-05-20

## 2025-04-21 RX ADMIN — CARVEDILOL 6.25 MILLIGRAM(S): 3.12 TABLET, FILM COATED ORAL at 11:30

## 2025-04-21 RX ADMIN — NICOTINE POLACRILEX 1 PATCH: 4 GUM, CHEWING ORAL at 06:51

## 2025-04-21 RX ADMIN — AMLODIPINE BESYLATE 10 MILLIGRAM(S): 10 TABLET ORAL at 07:43

## 2025-04-21 RX ADMIN — LISINOPRIL 20 MILLIGRAM(S): 5 TABLET ORAL at 06:09

## 2025-04-21 RX ADMIN — INSULIN LISPRO 2: 100 INJECTION, SOLUTION INTRAVENOUS; SUBCUTANEOUS at 09:13

## 2025-04-21 RX ADMIN — FUROSEMIDE 20 MILLIGRAM(S): 10 INJECTION INTRAMUSCULAR; INTRAVENOUS at 07:43

## 2025-04-21 NOTE — DISCHARGE NOTE PROVIDER - CARE PROVIDER_API CALL
Petey Turner  Nephrology  [address] [place] [address]  Phone: [phone]  Fax: [phone]  Follow Up Time: 2 weeks   Petey Turner  Nephrology  [address]  [place] [address]  Phone: [phone]  Fax: [phone]  Follow Up Time: 2 weeks    Tony Zamora  Cardiology  [address] [address]  Phone: [phone]  Fax: [phone]  Follow Up Time: 2 weeks   Tony Zamora  Cardiology  [address] [address]  Phone: [phone]  Fax: [phone]  Follow Up Time: 2 weeks    Neville Farah  Nephrology  [address], [address]  [place] [address]  Phone: [phone]  Fax: (   )    -  Scheduled Appointment: 05/07/2025 01:40 PM

## 2025-04-21 NOTE — PROGRESS NOTE ADULT - PROBLEM SELECTOR PLAN 4
Hb 7.7 on admission. Baseline per chart review appears to be ~8. EGD/CN in Sept 2024 showing multiple colonic AVMs, believe this is the likely source of acute blood loss anemia. Pt was also found to have melena on ED examination so suspect could have bleeding from upper GI tract as well. Pt was found to be iron deficient on previous admission and was discharged on po iron but unable to tolerate so has been getting outpatient iv iron infusions.   Iron panel DILIA  - see above  - transfuse for Hb < 7  - active T&S.

## 2025-04-21 NOTE — PROGRESS NOTE ADULT - PROBLEM SELECTOR PLAN 7
-restarting home lisinopril 40mg daily and amlodipine 10mg daily, coreg 6.25mg BID, later will restart lasix 20mg PO daily

## 2025-04-21 NOTE — PROGRESS NOTE ADULT - PROBLEM SELECTOR PLAN 2
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     Plan:  - pt instructed to schedule outpatient follow up with a nephrologist   - resume farxiga on dc today

## 2025-04-21 NOTE — DISCHARGE NOTE NURSING/CASE MANAGEMENT/SOCIAL WORK - PATIENT PORTAL LINK FT
You can access the FollowMyHealth Patient Portal offered by [facility] by registering at the following website: [email or web address]. By joining [facility]’s FollowMyHealth portal, you will also be able to view your health information using other applications (apps) compatible with our system.

## 2025-04-21 NOTE — PROGRESS NOTE ADULT - TIME BILLING
evaluation and management of recurrent lower GI bleed, colon AVMs, review of labs and imaging, discussion with consultant, documentation.
Time spent includes direct patient care  (interview and examination of patient), discussion with other providers, support staff and/or patient's family members, review of medical records, ordering diagnostic tests and analyzing results, and documentation.
Review of hospital course, labs, vitals and medical records  Bedside exam and patient interview   Discussion of plan with housestaff and consultants   Documenting the encounter

## 2025-04-21 NOTE — DISCHARGE NOTE PROVIDER - PROVIDER TOKENS
PROVIDER:[TOKEN:[[id number]],FOLLOWUP:[2 weeks]] PROVIDER:[TOKEN:[[id number]],FOLLOWUP:[2 weeks]],PROVIDER:[TOKEN:[[id number]],FOLLOWUP:[2 weeks]] PROVIDER:[TOKEN:[[id number]],FOLLOWUP:[2 weeks]],FREE:[LAST:[Farah],FIRST:[Neville],PHONE:[phone]],FAX:[(   )    -],ADDRESS:[Nephrology  [address] [place] [address]],SCHEDULEDAPPT:[05/07/2025],SCHEDULEDAPPTTIME:[01:40 PM]]

## 2025-04-21 NOTE — DISCHARGE NOTE NURSING/CASE MANAGEMENT/SOCIAL WORK - FINANCIAL ASSISTANCE
[facility] provides services at a reduced cost to those who are determined to be eligible through [facility]’s financial assistance program. Information regarding [facility]’s financial assistance program can be found by going to [email or web address] or by calling [phone].

## 2025-04-21 NOTE — DISCHARGE NOTE PROVIDER - NSDCFUADDAPPT_GEN_ALL_CORE_FT
Please follow-up with Dr. Castle at [address] [place] [address], [address] [facility], [place] [address] phone: [phone] - May 1, 2025 at 2:00 PM.     Please make an appointment with the nephrologist listed in this packed for within 2 weeks.  (1) Please follow-up with Dr. Castle at [address], [address] Floor, [place] [address] phone: [phone] - May 1, 2025 at 2:00 PM.     Please bring your Insurance card, Photo ID and Discharge paperwork to the following appointment:    (2) Please follow up with your Nephrology Provider, Dr. Neville Farah at [address], [address], [place] [address] on 5/7/2025 at 1:40pm.    Appointment was scheduled by Ms. MILO Root, Referral Coordinator.

## 2025-04-21 NOTE — PROGRESS NOTE ADULT - PROBLEM SELECTOR PLAN 6
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     - holding home farxiga 10mg. 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     - resume farxiga on dc

## 2025-04-21 NOTE — PROGRESS NOTE ADULT - ASSESSMENT
70 yo M w/ PMH of DM2, HTN, HCV (s/p treatment), KELLIE (on methadone 55mg qd), emphysema, suspected Group II/III Pulm HTN, grade II diastolic dysfunction, current smoker, DILIA, who was admitted 9/23-9/28/24 for GI bleed due to multiple colonic AVMs, presenting with lightheadedness and blood on the toilet paper.     Hgb baseline appears to be around 7-8 and patient's Hgb was 7.7 on admission. He is hemodynamically stable with no tachycardia and has been hypertensive during admission. Hard brown stool on rectal exam suggestive of constipation. Most likely that patient had hard stool leading to rectal trauma and small amount of rectal bleeding that has now resolved. Patient is due for CRC screening as his last colonoscopy had poor prep but it is reasonable to perform outpatient given patient's clinical stability and patient's preference to go home.     Recommendations:  - Trend CBC, maintain active T&S. Hemoglobin continues to improve since admission and without further bowel movements  - Start bowel regimen for constipation with miralax daily  - Pt should have outpatient GI follow-up w/ Dr. Castle at [address] [place] [address], [address] Floor, [place] [address] (phone: [phone]). Please include this information in DC paperwork. Will discuss timing of colonoscopy at that time.       Jagdish Ahmadi,   Gastroenterology Fellow  GI Consult Pager Weekdays 7am-5pm: [phone]  Weeknights/Weekend/Holiday Coverage: Please Call the  for Contact Information  Follow Up Questions Welcome via [place] Microsoft Teams Messenger  
70yo Male pt with PMH of T2DM, HTN, substance use disorder, anemia, LGIB 2/2 colonic AVMs and PSHx of open appendectomy (>40yrs ago) presenting to [facility] ED with 1 episode of BRBPR on TP associated with melanotic stool. Patient was recently admitted to [facility] in Sept 2024 for symptomatic anemia (H/H 4/14.1) in the setting of melanotic stool. During that admission, EGD/Cscope were done with findings of  nonerosive gastritis and multiple AVMs in the cecum, ascending colon, and descending colon. APC and endoclips were applied at these areas. Since previous admission, patient denies any other occurences of blood per rectum and reports consistent outpt follow up with hematology for iron deficiency and has received 4 iron transfusions in the past month.  Rectal exam showing no masses or hemorrhoids appreciated, NORBERT+ for melena, intact sphincter, no masses palpated. General surgery was consulted for melena. Patient reports no additional bowel movements.    Recommendations:  No acute surgical intervention at this time  Given recent known history of anemia 2/2 AVMs, this is likely source of melena  Recommend GI consult for possible repeat scope  Maintain active T&S  Maintain 2 large bore IVs  Transfuse for Hgb <7 prn  Team 4C will continue to follow at this time      Plan discussed with attending and chief resident.   ____________________________________________________  Nelly Smith - Resident   Surgery   
70yo Male pt with PMH of T2DM, HTN, substance use disorder, anemia, LGIB 2/2 colonic AVMs and PSHx of open appendectomy (>40yrs ago) presenting to [facility] ED with 1 episode of BRBPR on TP associated with melanotic stool. Patient was recently admitted to [facility] in Sept 2024 for symptomatic anemia (H/H 4/14.1) in the setting of melanotic stool. During that admission, EGD/Cscope were done with findings of  nonerosive gastritis and multiple AVMs in the cecum, ascending colon, and descending colon. APC and endoclips were applied at these areas. Since previous admission, patient denies any other occurences of blood per rectum and reports consistent outpt follow up with hematology for iron deficiency and has received 4 iron transfusions in the past month.  Rectal exam showing no masses or hemorrhoids appreciated, NORBERT+ for melena, intact sphincter, no masses palpated. General surgery was consulted for melena. Patient reports no additional bowel movements.    Recommendations:  No acute surgical intervention at this time  Given recent known history of anemia 2/2 AVMs, this is likely source of melena  f/u GI recs - outpatient eval  Maintain active T&S  Maintain 2 large bore IVs  Transfuse for Hgb <7 prn  Team 4C will continue to follow at this time  
68M with PMHx of diabetes, HTN, substance use disorder (on methadone 60mg qd) who p/w leg swelling, weakness and SOB x1 month. Found to have hemoglobin of 4. Admitted for evaluation of possible GI bleed and ADHF. CT abdomen also showed intra/extra hepatic biliary ductal dilation. Admitted to [facility] for further management s/p scope with GI.
70 yo M PMH DM2, HTN, CBD dilation, HCV (followed by hepatology in [place]), methadone, emphysema, suspected Group II/III Pulm HTN, grade II diastolic dysfunction TTE 9/24, smoker admitted 9/23-9/28/24 for GI bleed due to multiple colonic AVMs, anemia (getting outpatient iron infusions) presented 4/19 with lightheadedness/BRBPR, admitted for suspected LGIB from AVMs.

## 2025-04-21 NOTE — PROGRESS NOTE ADULT - PROBLEM SELECTOR PLAN 3
TTE 9/24/2024: LVIDd 5.06, LVEF 61%. Mild LVH. Normal size and systolic function. Mid and apical anterior wall, mid anterolateral segment, and apical lateral segment are hypokinetic. Grade II DD. Dilated RV. Normal RV function. Biatrial enlargement. Aortic sclerosis w/o stenosis. Pulmonary HTN, PASP 47 mmHg    on home lisinopril 40mg daily, coreg 6.25mg BID, farxiga 10 daily, lasix 20mg daily (not adherent to lasix)  On admission, home medications were held due to concerns of GIB.   Restarted 4/20.     -Restarting remaining home medications as well except farxiga due to ISABEL on CKD TTE 9/24/2024: LVIDd 5.06, LVEF 61%. Mild LVH. Normal size and systolic function. Mid and apical anterior wall, mid anterolateral segment, and apical lateral segment are hypokinetic. Grade II DD. Dilated RV. Normal RV function. Biatrial enlargement. Aortic sclerosis w/o stenosis. Pulmonary HTN, PASP 47 mmHg    on home lisinopril 40mg daily, coreg 6.25mg BID, farxiga 10 daily, lasix 20mg daily (not adherent to lasix)  On admission, home medications were held due to concerns of GIB.   Restarted 4/20.     -Restarting remaining home medications today on dc

## 2025-04-21 NOTE — PROGRESS NOTE ADULT - REASON FOR ADMISSION
History of Present Illness





- General


Chief Complaint: Chest Pain


Stated Complaint: PAIN


Time Seen by Provider: 12/18/18 12:48


History Source: Patient


Exam Limitations: No Limitations





- History of Present Illness


Initial Comments: 





12/18/18 13:20


Patient is a 16-year-old male with no past medical history who presents to the 

emergency department today for epigastric pain. Patient states that he has been 

intermittently having epigastric pain for 3 months. He notes that the pain 

comes and goes. He states the pain is consistent regardless eating. He states 

that sometimes he feels the pain going up his throat. Denies fevers, chills, 

nausea, vomiting, diarrhea, constipation, shortness of breath, difficulty 

breathing and chest pain.





Past History





- Travel


Traveled outside of the country in the last 30 days: No


Close contact w/someone who was outside of country & ill: No





- Past Medical History


Allergies/Adverse Reactions: 


 Allergies











Allergy/AdvReac Type Severity Reaction Status Date / Time


 


No Known Allergies Allergy   Verified 12/18/18 12:11











Home Medications: 


Ambulatory Orders





Ranitidine [Zantac -] 150 mg PO BID #14 tablet 12/18/18 








COPD: No





- Suicide/Smoking/Psychosocial Hx


Smoking History: Never smoked


Have you smoked in the past 12 months: No


Information on smoking cessation initiated: No


Hx Alcohol Use: No


Drug/Substance Use Hx: No





**Review of Systems





- Review of Systems


Able to Perform ROS?: Yes


Comments:: 





12/18/18 13:19


CONSTITUTIONAL


Absent: Diaphoresis, Fever, Loss of Appetite, Malaise, Weakness


HEENT: 


Absent: Nasal congestion, Mouth Swelling


RESPIRATORY: 


Absent: Cough, Stridor, Wheezing


CARDIOVASCULAR: 


Absent: Edema, Loss of consciousness


GASTROINTESTINAL: 


Present: epigastric pain Absent: Diarrhea, Vomiting


GENITOURINARY: 


Absent: Hematuria, Testicular Swelling, Lesions


MUSCULOSKELETAL: 


Absent: Joint Swelling


INTEGUEMENTARY: 


Absent: Lesions, Pallor, Rash


NEUROLOGICAL: 


Absent: Seizure, Weakness, Dizziness


ENDOCRINE: 


Absent: Unexplained Weight Gain, Unexplained Weight Loss


HEMATOLOGY: 


Absent: Easy Bleeding, Easy Bruising, Lymph Node Abnormalities





Is the patient limited English proficient: No





*Physical Exam





- Vital Signs


 Last Vital Signs











Temp Pulse Resp BP Pulse Ox


 


 98.2 F   82   16   118/59   100 


 


 12/18/18 12:07  12/18/18 12:07  12/18/18 12:07  12/18/18 12:07  12/18/18 12:07














- Physical Exam


Comments: 





12/18/18 13:20


GENERAL: 


The child is awake, alert, well appearing and in no apparent distress.  The 

child is appropriately interactive.


EYES: 


The pupils are equal, round and reactive to light.  Conjunctiva are clear.


HEENT: 


No nasal congestion or rhinorrhea. No sinus Tenderness. Mucous membranes are 

moist. No tonsillar erythema, exudate or edema.  Uvula is midline. No TM bulging

, dullness or erythema.


NECK: 


Neck is supple. No adenopathy.  No meningismus.  No stridor.  


CHEST: 


Lungs are clear to auscultation bilaterally. No crackles, wheezes or rhonchi. 

No respiratory distress or increased work of breathing.


CARDIOVASCULAR: 


Regular rate and rhythm.  Normal S1 and S2. No murmurs.


ABDOMEN: 


TTP of the epigastric region. Discomfort with Mcneal's testing. Soft and 

nondistended.  Normoactive bowel sounds.  No organomegaly.  No masses. No 

guarding or rebound.


EXTREMITIES: 


Full range of motion.  No deformities.  No joint swelling or tenderness.


SKIN: 


Warm.  No rashes, bruising or swelling.  Capillary refill is brisk and 

symmetric.  


NEURO: 


Behavior is normal for age. Tone is normal.





Moderate Sedation





- Procedure Monitoring


Vital Signs: 


Procedure Monitoring Vital Signs











Temperature  98.2 F   12/18/18 12:07


 


Pulse Rate  82   12/18/18 12:07


 


Respiratory Rate  16   12/18/18 12:07


 


Blood Pressure  118/59   12/18/18 12:07


 


O2 Sat by Pulse Oximetry (%)  100   12/18/18 12:07











Medical Decision Making





- Medical Decision Making





12/18/18 13:23


Pt is a 15 y/o M who presents with 3 months of epigstric pain; which is worse 

today





-TTP of the epigastric region on palpation. Discomfort with Mcneal's sign 


-DDX: GERD, PUD, Cholecystitis


-US Abdomen and zantac ordered


-re-evaluate





12/18/18 14:00


-US is negative for cholecystitis at this bear


-Pain relieved with zantac. 


-PUD vs. GERD


-DC home with PCP follow up and GI follow up


-I discussed the physical exam findings, ancillary test results and final 

diagnoses with the patient. I answered all of the patient's questions. The 

patient was satisfied with the care received and felt comfortable with the 

discharge plan and treatment plan.  The Patient agrees to follow up with the 

primary care physician/specialist within 24-72 hours. Return precautions were 

given.





*DC/Admit/Observation/Transfer


Diagnosis at time of Disposition: 


 Epigastric pain








- Discharge Dispostion


Disposition: HOME


Condition at time of disposition: Stable


Decision to Admit order: No





- Prescriptions


Prescriptions: 


Ranitidine [Zantac -] 150 mg PO BID #14 tablet





- Referrals


Referrals: 


Eloy Pena MD [Primary Care Provider] - 





- Patient Instructions


Printed Discharge Instructions:  DI for Epigastric Pain


Additional Instructions: 


You have abdominal (epigastric) pain


Your ultrasound showed a normal gallbladder today


Please take the Zantac twice a day for one week


Avoid spicy foods, fatty foods, and acidic foods as these can make your pain 

worse


Follow up with your primary care doctor this week. 


You also need to follow up with a gastroenterologist (stomach doctor). A 

referral has been provided





Return to the ED for worsening pain, fever, vomiting, or if you have any 

changes in your symptoms.





- Post Discharge Activity


Forms/Work/School Notes:  Back to School
GIB

## 2025-04-21 NOTE — DISCHARGE NOTE PROVIDER - NSDCMRMEDTOKEN_GEN_ALL_CORE_FT
amLODIPine 10 mg oral tablet: 1 tab(s) orally once a day  Coreg 6.25 mg oral tablet: 1 tab(s) orally 2 times a day  Farxiga 10 mg oral tablet: 1 tab(s) orally once a day  lisinopril 20 mg oral tablet: 1 tab(s) orally once a day  metFORMIN 1000 mg oral tablet: 1 tab(s) orally 2 times a day  methadone 10 mg/mL oral concentrate: 5.5 milliliter(s) orally every 24 hours   amLODIPine 10 mg oral tablet: 1 tab(s) orally once a day  Coreg 6.25 mg oral tablet: 1 tab(s) orally 2 times a day  Farxiga 10 mg oral tablet: 1 tab(s) orally once a day  furosemide 20 mg oral tablet: 1 tab(s) orally once a day  lisinopril 20 mg oral tablet: 1 tab(s) orally once a day  metFORMIN 1000 mg oral tablet: 1 tab(s) orally 2 times a day  methadone 10 mg/mL oral concentrate: 5.5 milliliter(s) orally every 24 hours   amLODIPine 10 mg oral tablet: 1 tab(s) orally once a day  Coreg 6.25 mg oral tablet: 1 tab(s) orally 2 times a day  Farxiga 10 mg oral tablet: 1 tab(s) orally once a day  furosemide 20 mg oral tablet: 1 tab(s) orally once a day  lisinopril 20 mg oral tablet: 1 tab(s) orally once a day  methadone 10 mg/mL oral concentrate: 5.5 milliliter(s) orally every 24 hours

## 2025-04-21 NOTE — DISCHARGE NOTE PROVIDER - CARE PROVIDERS DIRECT ADDRESSES
[email or web address]

## 2025-04-21 NOTE — PROGRESS NOTE ADULT - PROBLEM SELECTOR PLAN 5
09/2024: Intra/extra-hepatic biliary ductal dilation seen on CT imaging. Patient apparently following with hepatology annually for Hep C treated in past, no diagnosed history of other liver disease, no stones seen on imaging. Possibly obstruction iso malignancy with mets to liver.  09/2024 MRCP showing mild intra and extrahepatic ductal dilatation. No choledocholithiasis or mass lesion. No evidence of an ampullary or pancreatic head mass  As per GI, possibly secondary to opiod use    f/u outpatient.

## 2025-04-21 NOTE — DISCHARGE NOTE PROVIDER - NSDCCPCAREPLAN_GEN_ALL_CORE_FT
PRINCIPAL DISCHARGE DIAGNOSIS  Diagnosis: Hematochezia  Assessment and Plan of Treatment: Hematochezia is the passage of fresh, bright red blood in the stool, typically indicating bleeding from the lower gastrointestinal (GI) tract. It differs from melena, which refers to black, tarry stools resulting from upper GI bleeding. Common causes of hematochezia include hemorrhoids, diverticular disease, and colorectal cancer. Please be sure to follow up with the GI doctors at the appointment scheduled for you on 5/1/2025.      SECONDARY DISCHARGE DIAGNOSES  Diagnosis: HTN (hypertension)  Assessment and Plan of Treatment: Hypertension (high blood pressure) is when the pressure in your blood vessels is too high (140/90 mmHg or higher). It is common but can be serious if not treated. Among other complications, hypertension can cause serious damage to the heart. Excessive pressure can harden arteries, decreasing the flow of blood and oxygen to the heart. This elevated pressure and reduced blood flow can cause chest pain, heart attack, heart failure, irregular heart beat, stroke, and kidney damage. Please continue to take your hypertensive medications as tolerated.       Diagnosis: Acute kidney injury superimposed on CKD  Assessment and Plan of Treatment: Chronic Kidney Disease (CKD) is a condition characterized by the gradual loss of kidney function over time, often due to diseases such as diabetes and high blood pressure. Symptoms may include fatigue, swelling, and changes in urination. While there is no cure for CKD, management strategies can help preserve kidney function and may include lifestyle changes, medications, and in advanced stages, dialysis or kidney transplantation. CKD can also increase the risk of other health issues, such as heart disease and stroke. ISABEL on CKD when the kidney is injured in addition to the CKD. Please follow up with the nephrologist listed in this packet.    Diagnosis: Diastolic dysfunction with heart failure  Assessment and Plan of Treatment:     Diagnosis: DM2 (diabetes mellitus, type 2)  Assessment and Plan of Treatment: Diabetes mellitus refers to a group of diseases that affect how the body uses blood sugar (glucose). Glucose is an important source of energy for the cells that make up the muscles and tissues. It's also the brain's main source of fuel.     PRINCIPAL DISCHARGE DIAGNOSIS  Diagnosis: Hematochezia  Assessment and Plan of Treatment: Hematochezia is the passage of fresh, bright red blood in the stool, typically indicating bleeding from the lower gastrointestinal (GI) tract. It differs from melena, which refers to black, tarry stools resulting from upper GI bleeding. Common causes of hematochezia include hemorrhoids, diverticular disease, and colorectal cancer. Please be sure to follow up with the GI doctors at the appointment scheduled for you on 5/1/2025.      SECONDARY DISCHARGE DIAGNOSES  Diagnosis: HTN (hypertension)  Assessment and Plan of Treatment: Hypertension (high blood pressure) is when the pressure in your blood vessels is too high (140/90 mmHg or higher). It is common but can be serious if not treated. Among other complications, hypertension can cause serious damage to the heart. Excessive pressure can harden arteries, decreasing the flow of blood and oxygen to the heart. This elevated pressure and reduced blood flow can cause chest pain, heart attack, heart failure, irregular heart beat, stroke, and kidney damage. Please continue to take your hypertensive medications as tolerated. Please continue to take Lisinopril, Coreg, and Lasix to help control your blood pressure.       Diagnosis: Acute kidney injury superimposed on CKD  Assessment and Plan of Treatment: Chronic Kidney Disease (CKD) is a condition characterized by the gradual loss of kidney function over time, often due to diseases such as diabetes and high blood pressure. Symptoms may include fatigue, swelling, and changes in urination. While there is no cure for CKD, management strategies can help preserve kidney function and may include lifestyle changes, medications, and in advanced stages, dialysis or kidney transplantation. CKD can also increase the risk of other health issues, such as heart disease and stroke. ISABEL on CKD when the kidney is injured in addition to the CKD. Please follow up with the nephrologist listed in this packet.    Diagnosis: Diastolic dysfunction with heart failure  Assessment and Plan of Treatment: Heart failure occurs when the heart muscle doesn't pump blood as well as it should. When this happens, blood often backs up and fluid can build up in the lungs, causing shortness of breath. Please continue to take your Lasix and Coreg medications once daily and follow up with your cardiologist.    Diagnosis: DM2 (diabetes mellitus, type 2)  Assessment and Plan of Treatment: Diabetes mellitus refers to a group of diseases that affect how the body uses blood sugar (glucose). Glucose is an important source of energy for the cells that make up the muscles and tissues. It's also the brain's main source of fuel. Please continue to take Metformin and Farxiga when you leave the hospital.     PRINCIPAL DISCHARGE DIAGNOSIS  Diagnosis: Hematochezia  Assessment and Plan of Treatment: Hematochezia is the passage of fresh, bright red blood in the stool, typically indicating bleeding from the lower gastrointestinal (GI) tract. It differs from melena, which refers to black, tarry stools resulting from upper GI bleeding. Common causes of hematochezia include hemorrhoids, diverticular disease, and colorectal cancer. Please be sure to follow up with the GI doctors at the appointment scheduled for you on 5/1/2025.      SECONDARY DISCHARGE DIAGNOSES  Diagnosis: HTN (hypertension)  Assessment and Plan of Treatment: Hypertension (high blood pressure) is when the pressure in your blood vessels is too high (140/90 mmHg or higher). It is common but can be serious if not treated. Among other complications, hypertension can cause serious damage to the heart. Excessive pressure can harden arteries, decreasing the flow of blood and oxygen to the heart. This elevated pressure and reduced blood flow can cause chest pain, heart attack, heart failure, irregular heart beat, stroke, and kidney damage. Please continue to take your hypertensive medications as tolerated. Please continue to take Lisinopril, Coreg, and Lasix to help control your blood pressure.       Diagnosis: Acute kidney injury superimposed on CKD  Assessment and Plan of Treatment: Chronic Kidney Disease (CKD) is a condition characterized by the gradual loss of kidney function over time, often due to diseases such as diabetes and high blood pressure. Symptoms may include fatigue, swelling, and changes in urination. While there is no cure for CKD, management strategies can help preserve kidney function and may include lifestyle changes, medications, and in advanced stages, dialysis or kidney transplantation. CKD can also increase the risk of other health issues, such as heart disease and stroke. ISABEL on CKD when the kidney is injured in addition to the CKD. Please follow up with the nephrologist listed in this packet.    Diagnosis: Diastolic dysfunction with heart failure  Assessment and Plan of Treatment: Heart failure occurs when the heart muscle doesn't pump blood as well as it should. When this happens, blood often backs up and fluid can build up in the lungs, causing shortness of breath. Please continue to take your Lasix and Coreg medications once daily and follow up with your cardiologist.    Diagnosis: DM2 (diabetes mellitus, type 2)  Assessment and Plan of Treatment: Diabetes mellitus refers to a group of diseases that affect how the body uses blood sugar (glucose). Glucose is an important source of energy for the cells that make up the muscles and tissues. It's also the brain's main source of fuel. Please continue to take Metformin and Farxiga when you leave the hospital.    Diagnosis: Hyperlipidemia  Assessment and Plan of Treatment: Hyperlipidemia (high cholesterol) is an excess of lipids or fats in your blood. This can increase your risk of heart attack and stroke because blood can't flow through your arteries easily. You are taking a medication called Lipitor (unknown dose). Please make sure to continue taking this medication when you arrive home.     PRINCIPAL DISCHARGE DIAGNOSIS  Diagnosis: Hematochezia  Assessment and Plan of Treatment: Hematochezia is the passage of fresh, bright red blood in the stool, typically indicating bleeding from the lower gastrointestinal (GI) tract. It differs from melena, which refers to black, tarry stools resulting from upper GI bleeding. Common causes of hematochezia include hemorrhoids, diverticular disease, and colorectal cancer. Please be sure to follow up with the GI doctors at the appointment scheduled for you on 5/1/2025.      SECONDARY DISCHARGE DIAGNOSES  Diagnosis: HTN (hypertension)  Assessment and Plan of Treatment: Hypertension (high blood pressure) is when the pressure in your blood vessels is too high (140/90 mmHg or higher). It is common but can be serious if not treated. Among other complications, hypertension can cause serious damage to the heart. Excessive pressure can harden arteries, decreasing the flow of blood and oxygen to the heart. This elevated pressure and reduced blood flow can cause chest pain, heart attack, heart failure, irregular heart beat, stroke, and kidney damage. Please continue to take your hypertensive medications as tolerated. Please continue to take Lisinopril, Coreg, and Lasix to help control your blood pressure.       Diagnosis: Diastolic dysfunction with heart failure  Assessment and Plan of Treatment: Heart failure occurs when the heart muscle doesn't pump blood as well as it should. When this happens, blood often backs up and fluid can build up in the lungs, causing shortness of breath. Please continue to take your Lasix and Coreg medications once daily and follow up with your cardiologist.    Diagnosis: DM2 (diabetes mellitus, type 2)  Assessment and Plan of Treatment: Diabetes mellitus refers to a group of diseases that affect how the body uses blood sugar (glucose). Glucose is an important source of energy for the cells that make up the muscles and tissues. It's also the brain's main source of fuel. Please continue to take Farxiga when you leave the hospital.  --  PLEASE STOP TAKING YOUR METFORMIN FOR NOW. Your kidney function was borderline and since your creatinine (marker of kidney function) is more elevated than usual, please wait to see your doctor to get repeat labs before restarting it.    Diagnosis: Acute kidney injury superimposed on CKD  Assessment and Plan of Treatment: Chronic Kidney Disease (CKD) is a condition characterized by the gradual loss of kidney function over time, often due to diseases such as diabetes and high blood pressure. Symptoms may include fatigue, swelling, and changes in urination. While there is no cure for CKD, management strategies can help preserve kidney function and may include lifestyle changes, medications, and in advanced stages, dialysis or kidney transplantation. CKD can also increase the risk of other health issues, such as heart disease and stroke. ISABEL on CKD when the kidney is injured in addition to the CKD. Please follow up with the nephrologist listed in this packet.    Diagnosis: Hyperlipidemia  Assessment and Plan of Treatment: Hyperlipidemia (high cholesterol) is an excess of lipids or fats in your blood. This can increase your risk of heart attack and stroke because blood can't flow through your arteries easily. You are taking a medication called Lipitor (unknown dose). Please make sure to continue taking this medication when you arrive home.

## 2025-04-21 NOTE — DISCHARGE NOTE NURSING/CASE MANAGEMENT/SOCIAL WORK - NSDCFUADDAPPT_GEN_ALL_CORE_FT
Please follow-up with Dr. Castle at [address] [place] [address], [address] [facility], [place] [address] phone: [phone] - May 1, 2025 at 2:00 PM.     Please make an appointment with the nephrologist listed in this packed for within 2 weeks.

## 2025-04-21 NOTE — DISCHARGE NOTE PROVIDER - NSDCFUSCHEDAPPT_GEN_ALL_CORE_FT
[facility] Physician [facility] [address]  Scheduled Appointment: 05/27/2025     [facility] Physician [facility]  [place] [address]  Scheduled Appointment: 05/01/2025    [facility]  [facility] [address]  Scheduled Appointment: 05/27/2025

## 2025-04-21 NOTE — PROGRESS NOTE ADULT - SUBJECTIVE AND OBJECTIVE BOX
SUBJECTIVE: Patient seen and examined bedside by chief resident. Patient refers feeling a bit uneasy this morning, reports needing his methadone. Patient denies having bowel movements, nausea, vomiting, CP, SOB.    amLODIPine   Tablet 10 milliGRAM(s) Oral once  carvedilol 6.25 milliGRAM(s) Oral every 12 hours  lisinopril 20 milliGRAM(s) Oral daily    MEDICATIONS  (PRN):  acetaminophen     Tablet .. 650 milliGRAM(s) Oral every 6 hours PRN Temp greater or equal to 38C (100.4F), Mild Pain (1 - 3)  dextrose Oral Gel 15 Gram(s) Oral once PRN Blood Glucose LESS THAN 70 milliGRAM(s)/deciliter  melatonin 3 milliGRAM(s) Oral at bedtime PRN Insomnia  ondansetron Injectable 4 milliGRAM(s) IV Push every 8 hours PRN Nausea and/or Vomiting      I&O's Detail    19 Apr 2025 07:01  -  20 Apr 2025 07:00  --------------------------------------------------------  IN:  Total IN: 0 mL    OUT:    Voided (mL): 700 mL  Total OUT: 700 mL    Total NET: -700 mL          Vital Signs Last 24 Hrs  T(C): 36.9 (20 Apr 2025 11:00), Max: 37.2 (19 Apr 2025 11:42)  T(F): 98.5 (20 Apr 2025 11:00), Max: 99 (19 Apr 2025 11:42)  HR: 60 (20 Apr 2025 11:00) (60 - 89)  BP: 183/84 (20 Apr 2025 11:00) (152/66 - 196/87)  BP(mean): --  RR: 17 (20 Apr 2025 11:00) (17 - 18)  SpO2: 99% (20 Apr 2025 11:00) (95% - 99%)    Parameters below as of 20 Apr 2025 11:00  Patient On (Oxygen Delivery Method): room air        PHYSICAL EXAM:   Gen: NAD, resting comfortably   CV: NSR  Pulm: no respiratory distress on RA, CTAB   Abd: Soft, ND, NTTP, no rebound or guarding   Ext: WWP, no edema   Neuro: motor/sensory grossly intact     LABS:                        9.0    6.33  )-----------( 268      ( 20 Apr 2025 09:00 )             27.6     04-20    140  |  104  |  30[H]  ----------------------------<  161[H]  4.7   |  22  |  1.96[H]    Ca    9.2      20 Apr 2025 09:00  Phos  3.5     04-20  Mg     2.0     04-20    TPro  7.8  /  Alb  3.9  /  TBili  0.3  /  DBili  x   /  AST  17  /  ALT  15  /  AlkPhos  143[H]  04-20    LIVER FUNCTIONS - ( 20 Apr 2025 09:00 )  Alb: 3.9 g/dL / Pro: 7.8 g/dL / ALK PHOS: 143 U/L / ALT: 15 U/L / AST: 17 U/L / GGT: x           PT/INR - ( 20 Apr 2025 09:00 )   PT: 13.4 sec;   INR: 1.17          PTT - ( 20 Apr 2025 09:00 )  PTT:34.0 sec  CAPILLARY BLOOD GLUCOSE      POCT Blood Glucose.: 173 mg/dL (20 Apr 2025 08:44)  POCT Blood Glucose.: 126 mg/dL (19 Apr 2025 17:27)        Urinalysis Basic - ( 20 Apr 2025 09:00 )    Color: x / Appearance: x / SG: x / pH: x  Gluc: 161 mg/dL / Ketone: x  / Bili: x / Urobili: x   Blood: x / Protein: x / Nitrite: x   Leuk Esterase: x / RBC: x / WBC x   Sq Epi: x / Non Sq Epi: x / Bacteria: x             RADIOLOGY & ADDITIONAL STUDIES:      
SUBJECTIVE: Patient seen at bedside. No acute complaints. denies abd pain, bnausea vvomiting. Reports regualr BMs, non-bloody, passing gas. Denies lightheadedness, dizziness.       MEDICATIONS  (STANDING):  carvedilol 6.25 milliGRAM(s) Oral every 12 hours  dextrose 5%. 1000 milliLiter(s) (100 mL/Hr) IV Continuous <Continuous>  dextrose 5%. 1000 milliLiter(s) (50 mL/Hr) IV Continuous <Continuous>  dextrose 50% Injectable 25 Gram(s) IV Push once  dextrose 50% Injectable 12.5 Gram(s) IV Push once  dextrose 50% Injectable 25 Gram(s) IV Push once  glucagon  Injectable 1 milliGRAM(s) IntraMuscular once  influenza  Vaccine (HIGH DOSE) 0.5 milliLiter(s) IntraMuscular once  insulin lispro (ADMELOG) corrective regimen sliding scale   SubCutaneous Before meals and at bedtime  lisinopril 20 milliGRAM(s) Oral daily  nicotine -   7 mG/24Hr(s) Patch 1 Patch Transdermal daily    MEDICATIONS  (PRN):  acetaminophen     Tablet .. 650 milliGRAM(s) Oral every 6 hours PRN Temp greater or equal to 38C (100.4F), Mild Pain (1 - 3)  dextrose Oral Gel 15 Gram(s) Oral once PRN Blood Glucose LESS THAN 70 milliGRAM(s)/deciliter  melatonin 3 milliGRAM(s) Oral at bedtime PRN Insomnia  ondansetron Injectable 4 milliGRAM(s) IV Push every 8 hours PRN Nausea and/or Vomiting      Vital Signs Last 24 Hrs  T(C): 36.4 (21 Apr 2025 06:01), Max: 37 (20 Apr 2025 21:39)  T(F): 97.6 (21 Apr 2025 06:01), Max: 98.6 (20 Apr 2025 21:39)  HR: 59 (21 Apr 2025 06:01) (59 - 69)  BP: 159/77 (21 Apr 2025 06:01) (159/77 - 203/88)  BP(mean): --  RR: 17 (21 Apr 2025 06:01) (17 - 17)  SpO2: 97% (21 Apr 2025 06:01) (97% - 99%)    Parameters below as of 21 Apr 2025 06:01  Patient On (Oxygen Delivery Method): room air        PHYSICAL EXAM  General: NAD, resting comfortably  Pulmonary: normal resp effort  Cardiovascular: NSR  Abdominal: soft, ND/NT  Extremities: WWP, no clubbing/cyanosis/edema  Neuro: A/O x 3, no focal deficits    I&O's Detail      LABS:                        9.0    6.33  )-----------( 268      ( 20 Apr 2025 09:00 )             27.6     04-21    139  |  x   |  x   ----------------------------<  x   x    |  x   |  2.23[H]    Ca    9.2      20 Apr 2025 09:00  Phos  3.5     04-20  Mg     2.0     04-20    TPro  x   /  Alb  x   /  TBili  0.2  /  DBili  x   /  AST  x   /  ALT  x   /  AlkPhos  x   04-21    PT/INR - ( 21 Apr 2025 06:28 )   PT: 13.0 sec;   INR: 1.13          PTT - ( 20 Apr 2025 09:00 )  PTT:34.0 sec  Urinalysis Basic - ( 20 Apr 2025 09:00 )    Color: x / Appearance: x / SG: x / pH: x  Gluc: 161 mg/dL / Ketone: x  / Bili: x / Urobili: x   Blood: x / Protein: x / Nitrite: x   Leuk Esterase: x / RBC: x / WBC x   Sq Epi: x / Non Sq Epi: x / Bacteria: x        RADIOLOGY & ADDITIONAL STUDIES:
GASTROENTEROLOGY PROGRESS NOTE    INTERVAL/SUBJECTIVE:  Feels well today. Still without bowel movement. Hemoglobin improving.     Allergies    No Known Allergies    Intolerances      MEDICATIONS:  MEDICATIONS  (STANDING):  amLODIPine   Tablet 10 milliGRAM(s) Oral daily  carvedilol 6.25 milliGRAM(s) Oral every 12 hours  dextrose 5%. 1000 milliLiter(s) (100 mL/Hr) IV Continuous <Continuous>  dextrose 5%. 1000 milliLiter(s) (50 mL/Hr) IV Continuous <Continuous>  dextrose 50% Injectable 25 Gram(s) IV Push once  dextrose 50% Injectable 12.5 Gram(s) IV Push once  dextrose 50% Injectable 25 Gram(s) IV Push once  furosemide    Tablet 20 milliGRAM(s) Oral daily  glucagon  Injectable 1 milliGRAM(s) IntraMuscular once  influenza  Vaccine (HIGH DOSE) 0.5 milliLiter(s) IntraMuscular once  insulin lispro (ADMELOG) corrective regimen sliding scale   SubCutaneous Before meals and at bedtime  lisinopril 20 milliGRAM(s) Oral daily  nicotine -   7 mG/24Hr(s) Patch 1 Patch Transdermal daily    MEDICATIONS  (PRN):  acetaminophen     Tablet .. 650 milliGRAM(s) Oral every 6 hours PRN Temp greater or equal to 38C (100.4F), Mild Pain (1 - 3)  dextrose Oral Gel 15 Gram(s) Oral once PRN Blood Glucose LESS THAN 70 milliGRAM(s)/deciliter  melatonin 3 milliGRAM(s) Oral at bedtime PRN Insomnia  ondansetron Injectable 4 milliGRAM(s) IV Push every 8 hours PRN Nausea and/or Vomiting    Vital Signs Last 24 Hrs  T(C): 36.6 (21 Apr 2025 11:38), Max: 37 (20 Apr 2025 21:39)  T(F): 97.8 (21 Apr 2025 11:38), Max: 98.6 (20 Apr 2025 21:39)  HR: 60 (21 Apr 2025 11:38) (58 - 69)  BP: 133/73 (21 Apr 2025 13:19) (133/73 - 203/88)  BP(mean): --  RR: 17 (21 Apr 2025 11:38) (17 - 17)  SpO2: 100% (21 Apr 2025 11:38) (97% - 100%)    Parameters below as of 21 Apr 2025 11:38  Patient On (Oxygen Delivery Method): room air          PHYSICAL EXAM:    General: lying in bed, in no acute distress  HEENT: Neck supple, mmm, no jvd  Lungs: Normal respiratory effort, no intercostal retractions  Cardiovascular: regular rate  Abdomen: Soft, non-tender non-distended; No rebound or guarding  Neurological: SILVA, speech fluent  Skin: Warm and dry. No obvious rash  Rectal: Normal tone, hard brown stool       LABS:                        9.0    6.33  )-----------( 268      ( 20 Apr 2025 09:00 )             27.6     04-21    139  |  x   |  x   ----------------------------<  x   x    |  x   |  2.23[H]    Ca    9.2      20 Apr 2025 09:00  Phos  3.5     04-20  Mg     2.0     04-20    TPro  x   /  Alb  x   /  TBili  0.2  /  DBili  x   /  AST  x   /  ALT  x   /  AlkPhos  x   04-21    PT/INR - ( 21 Apr 2025 06:28 )   PT: 13.0 sec;   INR: 1.13          PTT - ( 20 Apr 2025 09:00 )  PTT:34.0 sec    RADIOLOGY & ADDITIONAL STUDIES: Reviewed
INTERVAL EVENTS: no significant overnight events.     SUBJECTIVE:   Requesting methadone that he states he gets from methadone clinic, gave card to verify.   No cough, chest pain, fevers/chills, shortness of breath. ROS negative otherwise.     Vital Signs Last 24 Hrs  T(C): 36.9 (04-20-25 @ 06:00), Max: 37.2 (04-19-25 @ 11:42)  T(F): 98.5 (04-20-25 @ 06:00), Max: 99 (04-19-25 @ 11:42)  HR: 68 (04-20-25 @ 06:00) (64 - 89)  BP: 190/79 (04-20-25 @ 06:00) (152/66 - 196/87)  BP(mean): --  RR: 18 (04-20-25 @ 06:00) (18 - 18)  SpO2: 98% (04-20-25 @ 06:00) (95% - 98%)      Exam  GENERAL/NEURO Awake, alert, NCAT, withdraws to pain & following commands, CN grossly intact  ENT: MMM, PERRL  CARDIOVASCULAR: RRR, no murmur  RESPIRATORY: No wheeze/rhonchi/crackles, no accessory muscle use  ABDOMEN: Abdomen soft, NT/ND, bowel sounds x4  SKIN/EXTREMETIES: No rashes, warm & dry, able to move all 4 extremeties, DP intact, reflexes normal                          8.2    7.32  )-----------( 252      ( 19 Apr 2025 19:09 )             25.4   04-19    137  |  101  |  39[H]  ----------------------------<  150[H]  4.7   |  22  |  2.16[H]    Ca    8.9      19 Apr 2025 12:22        Allergies    No Known Allergies    Intolerances     MEDICATIONS  (STANDING):  dextrose 5%. 1000 milliLiter(s) (50 mL/Hr) IV Continuous <Continuous>  dextrose 5%. 1000 milliLiter(s) (100 mL/Hr) IV Continuous <Continuous>  dextrose 50% Injectable 25 Gram(s) IV Push once  dextrose 50% Injectable 12.5 Gram(s) IV Push once  dextrose 50% Injectable 25 Gram(s) IV Push once  glucagon  Injectable 1 milliGRAM(s) IntraMuscular once  influenza  Vaccine (HIGH DOSE) 0.5 milliLiter(s) IntraMuscular once  insulin lispro (ADMELOG) corrective regimen sliding scale   SubCutaneous three times a day before meals  nicotine -   7 mG/24Hr(s) Patch 1 Patch Transdermal daily    MEDICATIONS  (PRN):  acetaminophen     Tablet .. 650 milliGRAM(s) Oral every 6 hours PRN Temp greater or equal to 38C (100.4F), Mild Pain (1 - 3)  dextrose Oral Gel 15 Gram(s) Oral once PRN Blood Glucose LESS THAN 70 milliGRAM(s)/deciliter  melatonin 3 milliGRAM(s) Oral at bedtime PRN Insomnia  ondansetron Injectable 4 milliGRAM(s) IV Push every 8 hours PRN Nausea and/or Vomiting  Diet, NPO after Midnight:      NPO Start Date: 19-Apr-2025,   NPO Start Time: 23:59 (04-19-25 @ 17:19) [Active]  Diet, Clear Liquid (04-19-25 @ 17:19) [Active]      Home Medications:  amLODIPine 10 mg oral tablet: 1 tab(s) orally once a day (19 Apr 2025 15:23)  Coreg 6.25 mg oral tablet: 1 tab(s) orally 2 times a day (19 Apr 2025 15:21)  lisinopril 20 mg oral tablet: 1 tab(s) orally once a day (19 Apr 2025 15:26)  metFORMIN 1000 mg oral tablet: 1 tab(s) orally 2 times a day (19 Apr 2025 15:24)  methadone 10 mg/mL oral concentrate: 5.5 milliliter(s) orally every 24 hours (28 Sep 2024 10:13)  
    SUBJECTIVE: Patient seen and examined at bedside, resting comfortably in bed, and does not appear to be in any acute distress. Patient reports feeling well. Denies chest pain or palpitations or shortness of breath.     Vital Signs Last 24 Hrs  T(C): 36.6 (21 Apr 2025 11:38), Max: 37 (20 Apr 2025 21:39)  T(F): 97.8 (21 Apr 2025 11:38), Max: 98.6 (20 Apr 2025 21:39)  HR: 60 (21 Apr 2025 11:38) (58 - 69)  BP: 133/73 (21 Apr 2025 13:19) (133/73 - 203/88)  BP(mean): --  RR: 17 (21 Apr 2025 11:38) (17 - 17)  SpO2: 100% (21 Apr 2025 11:38) (97% - 100%)    Parameters below as of 21 Apr 2025 11:38  Patient On (Oxygen Delivery Method): room air        PHYSICAL EXAM:  General: in no acute distress. Sitting up in bed.   Eyes: EOMI intact bilaterally. Anicteric sclerae, moist conjunctivae  HENT: Moist mucous membranes  Neck: Trachea midline, supple  Lungs: CTA B/L. No wheezes, rales, or rhonchi  Cardiovascular: RRR. No murmurs, rubs, or gallops  Abdomen: Soft, non-tender non-distended; No rebound or guarding  Extremities: WWP, No clubbing, cyanosis or edema  Neurological: Alert and oriented x3.   Skin: Warm and dry. No obvious rash     LABS:                        9.0    6.33  )-----------( 268      ( 20 Apr 2025 09:00 )             27.6     04-21    139  |  x   |  x   ----------------------------<  x   x    |  x   |  2.23[H]    Ca    9.2      20 Apr 2025 09:00  Phos  3.5     04-20  Mg     2.0     04-20    TPro  x   /  Alb  x   /  TBili  0.2  /  DBili  x   /  AST  x   /  ALT  x   /  AlkPhos  x   04-21

## 2025-04-21 NOTE — DISCHARGE NOTE PROVIDER - HOSPITAL COURSE
68 yo M PMH DM2, HTN, CBD dilation, HCV (followed by hepatology in [place]), methadone, emphysema, suspected Group II/III Pulm HTN, grade II diastolic dysfunction TTE 9/24, smoker admitted 9/23-9/28/24 for GI bleed due to multiple colonic AVMs, anemia (getting outpatient iron infusions) presented 4/19 with lightheadedness/BRBPR, admitted for suspected LGIB from AVMs, GI rec for outpatient w/u. Course complicated by elevated BP's. Patient restarted on home BP meds with improvement.     Hospital course (by problem):     # Bloody stool.   CC episode of BRBPR day of admission.   Sept 2024 admission for anemia Hb 4, EGD/CN performed at that time showing multiple colonic AVMs.   Suspected source of BRBPR is colonic AVMs again.   Noted to also have melena on NORBERT in ED and patient has elevated BUN - likely from renal injury, less likely from GIB, no c/f brisk bleed at this time.     GI final recommendations for outpatient scope, patient agrees with plan, no acute need for inpatient eval/intervention as he is hemodynamically showing no consistent signs of acute bleed given hypertension history explaining rise in BP after holding of patient's home medications, without tachycardia during entire hospitalization, and improved to baseline Hb without transfusions, no melena/BR per stool since admission to [facility]. Surgery contacted as well, no surgical intervention     - GI follow up     # Acute kidney injury superimposed on CKD.   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     Plan:  - trend Cr  - avoid nephrotoxic drugs, renally dose medications  - avoid labile pressures  - hold farxiga on dc.    # Diastolic dysfunction with heart failure.   · TTE 9/24/2024: LVIDd 5.06, LVEF 61%. Mild LVH. Normal size and systolic function. Mid and apical anterior wall, mid anterolateral segment, and apical lateral segment are hypokinetic. Grade II DD. Dilated RV. Normal RV function. Biatrial enlargement. Aortic sclerosis w/o stenosis. Pulmonary HTN, PASP 47 mmHg    on home lisinopril 40mg daily, coreg 6.25mg BID, farxiga 10 daily, lasix 20mg daily (not adherent to lasix)  On admission, home medications were held due to concerns of GIB.   Restarted 4/20.     - resume Farxiga on dc     #Anemia.   Hb 7.7 on admission. Baseline per chart review appears to be ~8. EGD/CN in Sept 2024 showing multiple colonic AVMs, believe this is the likely source of acute blood loss anemia. Pt was also found to have melena on ED examination so suspect could have bleeding from upper GI tract as well. Pt was found to be iron deficient on previous admission and was discharged on po iron but unable to tolerate so has been getting outpatient iv iron infusions.   Iron panel DILIA  - c/w OP iron infusions   - OP GI follow up     #Common bile duct dilation.   09/2024: Intra/extra-hepatic biliary ductal dilation seen on CT imaging. Patient apparently following with hepatology annually for Hep C treated in past, no diagnosed history of other liver disease, no stones seen on imaging. Possibly obstruction iso malignancy with mets to liver.  09/2024 MRCP showing mild intra and extrahepatic ductal dilatation. No choledocholithiasis or mass lesion. No evidence of an ampullary or pancreatic head mass  As per GI, possibly secondary to opiod use    f/u outpatient.    # Pulmonary hypertension.    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     - resume home farxiga 10mg on dc     #HTN (hypertension).   - resume home lisinopril 40mg daily and amlodipine 10mg daily, coreg 6.25mg BID, later will restart lasix 20mg PO daily.    #DM2 (diabetes mellitus, type 2).  on home metformin 1000mg BID  A1c 6.2     - c/w home med on dc     #Substance use disorder.   ·Methadone 55mg PO daily, continue.    Patient was discharged to: home     New medications:   Changes to old medications:  Medications that were stopped:    Items to follow up as outpatient: GI follow up, Heme/onc follow up, Nephrology follow up     Physical Examination:  General: Alert and oriented x 3. No acute distress. Well-nourished.  Eyes: EOMI. Anicteric.  HEENT: Moist mucous membranes. No scleral icterus. No cervical lymphadenopathy.  Lungs: Clear to auscultation bilaterally. No accessory muscle use.  Cardiovascular: Regular rate and rhythm. No murmur. No JVD.  Abdomen: Soft, non-tender and non-distended. No palpable masses.  Extremities: No edema. Non-tender.  Skin: No rashes or lesions. Warm.  Neurologic: No focal neurological deficits. CN II-XII grossly intact, but not individually tested.  Psychiatric: Cooperative. Appropriate mood and affect.           68 yo M PMH DM2, HTN, CBD dilation, HCV (followed by hepatology in [place]), methadone, emphysema, suspected Group II/III Pulm HTN, grade II diastolic dysfunction TTE 9/24, smoker admitted 9/23-9/28/24 for GI bleed due to multiple colonic AVMs, anemia (getting outpatient iron infusions) presented 4/19 with lightheadedness/BRBPR, admitted for suspected LGIB from AVMs, GI rec for outpatient w/u. Course complicated by elevated BP's. Patient restarted on home BP meds with improvement.     Hospital course (by problem):     # Bloody stool.   CC episode of BRBPR day of admission.   Sept 2024 admission for anemia Hb 4, EGD/CN performed at that time showing multiple colonic AVMs.   Suspected source of BRBPR is colonic AVMs again.   Noted to also have melena on NORBERT in ED and patient has elevated BUN - likely from renal injury, less likely from GIB, no c/f brisk bleed at this time.     GI final recommendations for outpatient scope, patient agrees with plan, no acute need for inpatient eval/intervention as he is hemodynamically showing no consistent signs of acute bleed given hypertension history explaining rise in BP after holding of patient's home medications, without tachycardia during entire hospitalization, and improved to baseline Hb without transfusions, no melena/BR per stool since admission to [facility]. Surgery contacted as well, no surgical intervention     - GI follow up     # Acute kidney injury superimposed on CKD.   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     Plan:  - trend Cr  - avoid nephrotoxic drugs, renally dose medications  - avoid labile pressures  - hold farxiga on dc.    # Diastolic dysfunction with heart failure.   · TTE 9/24/2024: LVIDd 5.06, LVEF 61%. Mild LVH. Normal size and systolic function. Mid and apical anterior wall, mid anterolateral segment, and apical lateral segment are hypokinetic. Grade II DD. Dilated RV. Normal RV function. Biatrial enlargement. Aortic sclerosis w/o stenosis. Pulmonary HTN, PASP 47 mmHg    on home lisinopril 40mg daily, coreg 6.25mg BID, farxiga 10 daily, lasix 20mg daily (not adherent to lasix)  On admission, home medications were held due to concerns of GIB.   Restarted 4/20.     - resume Farxiga on dc     #Anemia.   Hb 7.7 on admission. Baseline per chart review appears to be ~8. EGD/CN in Sept 2024 showing multiple colonic AVMs, believe this is the likely source of acute blood loss anemia. Pt was also found to have melena on ED examination so suspect could have bleeding from upper GI tract as well. Pt was found to be iron deficient on previous admission and was discharged on po iron but unable to tolerate so has been getting outpatient iv iron infusions.   Iron panel DILIA  - c/w OP iron infusions   - OP GI follow up     #Common bile duct dilation.   09/2024: Intra/extra-hepatic biliary ductal dilation seen on CT imaging. Patient apparently following with hepatology annually for Hep C treated in past, no diagnosed history of other liver disease, no stones seen on imaging. Possibly obstruction iso malignancy with mets to liver.  09/2024 MRCP showing mild intra and extrahepatic ductal dilatation. No choledocholithiasis or mass lesion. No evidence of an ampullary or pancreatic head mass  As per GI, possibly secondary to opiod use    f/u outpatient.    # Pulmonary hypertension.    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     - resume home farxiga 10mg on dc     #HTN (hypertension).   - resume home lisinopril 40mg daily and amlodipine 10mg daily, coreg 6.25mg BID, later will restart lasix 20mg PO daily.    #DM2 (diabetes mellitus, type 2).  on home metformin 1000mg BID  A1c 6.2     - c/w farxiga   -HOLDING METFORMIN given borderline CrCl (32). Plan to restart once repeat BMP reveals stable kidney function     #Substance use disorder.   ·Methadone 55mg PO daily, continue.    Patient was discharged to: home     New medications: None   Changes to old medications: None   Medications that were stopped: Metformin (hold for now until CrCl >30 on repeat outpatient labs given borderline CrCl)     Items to follow up as outpatient: GI follow up, Heme/onc follow up, Nephrology follow up     Physical Examination:  General: Alert and oriented x 3. No acute distress. Well-nourished.  Eyes: EOMI. Anicteric.  HEENT: Moist mucous membranes. No scleral icterus. No cervical lymphadenopathy.  Lungs: Clear to auscultation bilaterally. No accessory muscle use.  Cardiovascular: Regular rate and rhythm. No murmur. No JVD.  Abdomen: Soft, non-tender and non-distended. No palpable masses.  Extremities: No edema. Non-tender.  Skin: No rashes or lesions. Warm.  Neurologic: No focal neurological deficits. CN II-XII grossly intact, but not individually tested.  Psychiatric: Cooperative. Appropriate mood and affect.

## 2025-04-21 NOTE — PROGRESS NOTE ADULT - PROBLEM SELECTOR PLAN 1
CC episode of BRBPR day of admission.   Sept 2024 admission for anemia Hb 4, EGD/CN performed at that time showing multiple colonic AVMs.   Suspected source of BRBPR is colonic AVMs again.   Noted to also have melena on NORBERT in ED and patient has elevated BUN - likely from renal injury, less likely from GIB, no c/f brisk bleed at this time.     GI final recommendations for outpatient scope, patient agrees with plan, no acute need for inpatient eval/intervention as he is hemodynamically showing no consistent signs of acute bleed given hypertension history explaining rise in BP after holding of patient's home medications, without tachycardia during entire hospitalization, and improved to baseline Hb without transfusions, no melena/BR per stool since admission to [facility].     - surgery consulted in ED, no intervention  - GI consulted, recs appreciated, no intervention  - outpatient followup CC episode of BRBPR day of admission.   Sept 2024 admission for anemia Hb 4, EGD/CN performed at that time showing multiple colonic AVMs.   Suspected source of BRBPR is colonic AVMs again.   Noted to also have melena on NORBERT in ED and patient has elevated BUN - likely from renal injury, less likely from GIB, no c/f brisk bleed at this time.     GI final recommendations for outpatient scope, patient agrees with plan, no acute need for inpatient eval/intervention as he is hemodynamically showing no consistent signs of acute bleed given hypertension history explaining rise in BP after holding of patient's home medications, without tachycardia during entire hospitalization, and improved to baseline Hb without transfusions, no melena/BR per stool since admission to [facility].     - surgery consulted in ED, no intervention  - GI consulted, recs appreciated, no intervention - pt scheduled for OP visit with Dr. Castle on 5/1/2025

## 2025-04-21 NOTE — PROGRESS NOTE ADULT - PROBLEM SELECTOR PLAN 10
F: s/p 1L NS  E: replete as needed  N: NPO for now  DVT ppx: hold iso anemia  Dispo: [facility].
F: s/p 1L NS  E: replete as needed  N: NPO for now  DVT ppx: hold iso anemia  Dispo: [facility].

## 2025-04-21 NOTE — PROGRESS NOTE ADULT - ATTENDING COMMENTS
No acute issues, no blood per rectum.  H&H has been stable.  Per GI outpatient follow up.  Please let us know if we can be of further assistance.
I have personally seen and examined the patient.  I fully participated in the care of this patient.  I have made amendments to the documentation where necessary, and agree with the history, physical exam, and plan as documented by Dr Ahmadi    68 yo M w/ PMH of DM2, HTN, HCV (s/p treatment), KELLIE (on methadone 55mg qd), emphysema, suspected Group II/III Pulm HTN, grade II diastolic dysfunction, current smoker, DILIA, who was admitted 9/23-9/28/24 for GI bleed due to multiple colonic AVMs, presenting with lightheadedness and blood on the toilet paper, who has remained hemodynamically stable and having stable Hb.    Plan  -hematochezia and anemia - hematochezia has resolved, Hb higher than prior admission, agree with fellow and needs close outpatient follow up    Juan Gilbert MD  Gastroenterology
Patient is a 69 year old gentleman with history of hypertension, Type 2 diabetes, substance use, anemia, recurrent lower GI bleed secondary to colonic AVMs, surgical history of open appendectomy who presents to ED with clinical, laboratory evidence of recurrent lower GI bleed. He had melanotic stool. Recently underwent endoscopy with GI and noted to have AVMs in cecum ascending colon and descending colon s/p APC and endoclips. At time of evaluation, afebrile, hemodynamically stable, abdomen soft, nontender, nondistended, no rebound or guarding. No plan for surgical intervention. Follow up GI recommendations.
69-year-old male with a PMHx of DMII, HTN, CBD dilation, HCV, substance use disorder (on Methadone), HFpEF, CKD (baseline Cr 1.5), emphysema, pHTN and UGIB 2/2 colonic AVMs (s/p APC and endoclips) who presented with lightheadedness and blood on toilet paper.      Plan:    -GI consulted, plan for outpatient colonoscopy, continue with bowel regimen    -surgery consulted, no acute intervention    -outpatient nephrology referral, repeat BMP in 1-2 weeks  -continue with current anti-hypertensive regimen, hold Farxiga     Rest of plan as per resident note.

## 2025-04-23 ENCOUNTER — NON-APPOINTMENT (OUTPATIENT)
Age: 70
End: 2025-04-23

## 2025-04-23 RX ORDER — AMLODIPINE BESYLATE 10 MG/1
10 TABLET ORAL
Qty: 90 | Refills: 1 | Status: ACTIVE | COMMUNITY
Start: 2025-04-23

## 2025-04-23 RX ORDER — METFORMIN HYDROCHLORIDE 1000 MG/1
1000 TABLET, COATED ORAL
Qty: 180 | Refills: 0 | Status: ACTIVE | COMMUNITY
Start: 2025-04-23

## 2025-04-23 RX ORDER — CARVEDILOL 6.25 MG/1
6.25 TABLET, FILM COATED ORAL
Refills: 0 | Status: ACTIVE | COMMUNITY
Start: 2025-04-23

## 2025-04-25 DIAGNOSIS — K83.8 OTHER SPECIFIED DISEASES OF BILIARY TRACT: ICD-10-CM

## 2025-04-25 DIAGNOSIS — N18.9 CHRONIC KIDNEY DISEASE, UNSPECIFIED: ICD-10-CM

## 2025-04-25 DIAGNOSIS — K55.21 ANGIODYSPLASIA OF COLON WITH HEMORRHAGE: ICD-10-CM

## 2025-04-25 DIAGNOSIS — F11.90 OPIOID USE, UNSPECIFIED, UNCOMPLICATED: ICD-10-CM

## 2025-04-25 DIAGNOSIS — I27.23 PULMONARY HYPERTENSION DUE TO LUNG DISEASES AND HYPOXIA: ICD-10-CM

## 2025-04-25 DIAGNOSIS — I50.32 CHRONIC DIASTOLIC (CONGESTIVE) HEART FAILURE: ICD-10-CM

## 2025-04-25 DIAGNOSIS — I27.22 PULMONARY HYPERTENSION DUE TO LEFT HEART DISEASE: ICD-10-CM

## 2025-04-25 DIAGNOSIS — N17.9 ACUTE KIDNEY FAILURE, UNSPECIFIED: ICD-10-CM

## 2025-04-25 DIAGNOSIS — D62 ACUTE POSTHEMORRHAGIC ANEMIA: ICD-10-CM

## 2025-04-25 DIAGNOSIS — J43.9 EMPHYSEMA, UNSPECIFIED: ICD-10-CM

## 2025-04-25 DIAGNOSIS — K59.00 CONSTIPATION, UNSPECIFIED: ICD-10-CM

## 2025-04-25 DIAGNOSIS — B19.20 UNSPECIFIED VIRAL HEPATITIS C WITHOUT HEPATIC COMA: ICD-10-CM

## 2025-04-25 DIAGNOSIS — E11.22 TYPE 2 DIABETES MELLITUS WITH DIABETIC CHRONIC KIDNEY DISEASE: ICD-10-CM

## 2025-04-25 DIAGNOSIS — Z79.84 LONG TERM (CURRENT) USE OF ORAL HYPOGLYCEMIC DRUGS: ICD-10-CM

## 2025-04-25 DIAGNOSIS — I13.0 HYPERTENSIVE HEART AND CHRONIC KIDNEY DISEASE WITH HEART FAILURE AND STAGE 1 THROUGH STAGE 4 CHRONIC KIDNEY DISEASE, OR UNSPECIFIED CHRONIC KIDNEY DISEASE: ICD-10-CM

## 2025-04-25 DIAGNOSIS — F17.210 NICOTINE DEPENDENCE, CIGARETTES, UNCOMPLICATED: ICD-10-CM

## 2025-05-01 ENCOUNTER — APPOINTMENT (OUTPATIENT)
Dept: HEART AND VASCULAR | Facility: CLINIC | Age: 70
End: 2025-05-01

## 2025-05-01 ENCOUNTER — NON-APPOINTMENT (OUTPATIENT)
Age: 70
End: 2025-05-01

## 2025-05-01 VITALS
OXYGEN SATURATION: 98 % | TEMPERATURE: 98.2 F | DIASTOLIC BLOOD PRESSURE: 65 MMHG | BODY MASS INDEX: 24.75 KG/M2 | HEIGHT: 66 IN | WEIGHT: 154 LBS | SYSTOLIC BLOOD PRESSURE: 140 MMHG | HEART RATE: 62 BPM

## 2025-05-01 DIAGNOSIS — I27.20 PULMONARY HYPERTENSION, UNSPECIFIED: ICD-10-CM

## 2025-05-01 DIAGNOSIS — I50.9 HEART FAILURE, UNSPECIFIED: ICD-10-CM

## 2025-05-01 DIAGNOSIS — E08.9 DIABETES MELLITUS DUE TO UNDERLYING CONDITION W/OUT COMPLICATIONS: ICD-10-CM

## 2025-05-01 PROCEDURE — 99495 TRANSJ CARE MGMT MOD F2F 14D: CPT | Mod: 25

## 2025-05-01 PROCEDURE — 93000 ELECTROCARDIOGRAM COMPLETE: CPT

## 2025-05-01 PROCEDURE — 99406 BEHAV CHNG SMOKING 3-10 MIN: CPT

## 2025-05-07 ENCOUNTER — LABORATORY RESULT (OUTPATIENT)
Age: 70
End: 2025-05-07

## 2025-05-07 ENCOUNTER — APPOINTMENT (OUTPATIENT)
Dept: NEPHROLOGY | Facility: CLINIC | Age: 70
End: 2025-05-07
Payer: MEDICARE

## 2025-05-07 VITALS — SYSTOLIC BLOOD PRESSURE: 136 MMHG | DIASTOLIC BLOOD PRESSURE: 63 MMHG

## 2025-05-07 DIAGNOSIS — I10 ESSENTIAL (PRIMARY) HYPERTENSION: ICD-10-CM

## 2025-05-07 DIAGNOSIS — K92.2 GASTROINTESTINAL HEMORRHAGE, UNSPECIFIED: ICD-10-CM

## 2025-05-07 DIAGNOSIS — N18.9 CHRONIC KIDNEY DISEASE, UNSPECIFIED: ICD-10-CM

## 2025-05-07 DIAGNOSIS — R80.9 PROTEINURIA, UNSPECIFIED: ICD-10-CM

## 2025-05-07 PROCEDURE — 99203 OFFICE O/P NEW LOW 30 MIN: CPT

## 2025-05-08 ENCOUNTER — APPOINTMENT (OUTPATIENT)
Dept: GASTROENTEROLOGY | Facility: CLINIC | Age: 70
End: 2025-05-08

## 2025-05-08 ENCOUNTER — LABORATORY RESULT (OUTPATIENT)
Age: 70
End: 2025-05-08

## 2025-05-08 VITALS
SYSTOLIC BLOOD PRESSURE: 116 MMHG | OXYGEN SATURATION: 100 % | BODY MASS INDEX: 23.78 KG/M2 | DIASTOLIC BLOOD PRESSURE: 74 MMHG | TEMPERATURE: 98.1 F | WEIGHT: 148 LBS | RESPIRATION RATE: 15 BRPM | HEIGHT: 66 IN | HEART RATE: 63 BPM

## 2025-05-08 DIAGNOSIS — D50.9 IRON DEFICIENCY ANEMIA, UNSPECIFIED: ICD-10-CM

## 2025-05-08 DIAGNOSIS — Z12.11 ENCOUNTER FOR SCREENING FOR MALIGNANT NEOPLASM OF COLON: ICD-10-CM

## 2025-05-08 PROCEDURE — 99214 OFFICE O/P EST MOD 30 MIN: CPT

## 2025-05-08 RX ORDER — MAGNESIUM CITRATE 1.75 G/29.6ML
1.75 LIQUID ORAL
Qty: 2 | Refills: 0 | Status: ACTIVE | COMMUNITY
Start: 2025-05-08 | End: 1900-01-01

## 2025-05-08 RX ORDER — POLYETHYLENE GLYCOL 3350 AND ELECTROLYTES WITH LEMON FLAVOR 236; 22.74; 6.74; 5.86; 2.97 G/4L; G/4L; G/4L; G/4L; G/4L
236 POWDER, FOR SOLUTION ORAL
Qty: 1 | Refills: 0 | Status: ACTIVE | COMMUNITY
Start: 2025-05-08 | End: 1900-01-01

## 2025-05-12 PROBLEM — R80.9 ALBUMINURIA: Status: ACTIVE | Noted: 2025-05-12

## 2025-05-12 LAB
ALBUMIN SERPL ELPH-MCNC: 4.1 G/DL
ALP BLD-CCNC: 122 U/L
ALT SERPL-CCNC: 13 U/L
ANION GAP SERPL CALC-SCNC: 15 MMOL/L
APPEARANCE: CLEAR
AST SERPL-CCNC: 15 U/L
BASOPHILS # BLD AUTO: 0.03 K/UL
BASOPHILS NFR BLD AUTO: 0.4 %
BILIRUB SERPL-MCNC: <0.2 MG/DL
BILIRUBIN URINE: NEGATIVE
BLOOD URINE: NEGATIVE
BUN SERPL-MCNC: 41 MG/DL
CALCIUM SERPL-MCNC: 9.4 MG/DL
CALCIUM SERPL-MCNC: 9.4 MG/DL
CHLORIDE SERPL-SCNC: 104 MMOL/L
CO2 SERPL-SCNC: 20 MMOL/L
COLOR: YELLOW
CREAT SERPL-MCNC: 2.72 MG/DL
CREAT SERPL-MCNC: 2.72 MG/DL
CREAT SPEC-SCNC: 75 MG/DL
CREAT SPEC-SCNC: 75 MG/DL
CREAT/PROT UR: 0.8 RATIO
CYSTATIN C SERPL-MCNC: 2.24 MG/L
EGFRCR SERPLBLD CKD-EPI 2021: 25 ML/MIN/1.73M2
EGFRCR SERPLBLD CKD-EPI 2021: 25 ML/MIN/1.73M2
EGFRCR-CYS SERPLBLD CKD-EPI 2021: 26 ML/MIN/1.73M2
EOSINOPHIL # BLD AUTO: 0.25 K/UL
EOSINOPHIL NFR BLD AUTO: 3.2 %
GFR/BSA.PRED SERPLBLD CYS-BASED-ARV: 26 ML/MIN/1.73M2
GLUCOSE QUALITATIVE U: 500 MG/DL
GLUCOSE SERPL-MCNC: 129 MG/DL
HCT VFR BLD CALC: 25.4 %
HGB BLD-MCNC: 8.1 G/DL
IMM GRANULOCYTES NFR BLD AUTO: 0.4 %
IRON SATN MFR SERPL: 19 %
IRON SERPL-MCNC: 59 UG/DL
KETONES URINE: NEGATIVE MG/DL
LEUKOCYTE ESTERASE URINE: NEGATIVE
LYMPHOCYTES # BLD AUTO: 1.21 K/UL
LYMPHOCYTES NFR BLD AUTO: 15.5 %
MAGNESIUM SERPL-MCNC: 2.4 MG/DL
MAN DIFF?: NORMAL
MCHC RBC-ENTMCNC: 29.2 PG
MCHC RBC-ENTMCNC: 31.9 G/DL
MCV RBC AUTO: 91.7 FL
MICROALBUMIN 24H UR DL<=1MG/L-MCNC: 32 MG/DL
MICROALBUMIN/CREAT 24H UR-RTO: 424 MG/G
MONOCYTES # BLD AUTO: 0.75 K/UL
MONOCYTES NFR BLD AUTO: 9.6 %
NEUTROPHILS # BLD AUTO: 5.53 K/UL
NEUTROPHILS NFR BLD AUTO: 70.9 %
NITRITE URINE: NEGATIVE
PARATHYROID HORMONE INTACT: 53 PG/ML
PH URINE: 6
PHOSPHATE SERPL-MCNC: 3.9 MG/DL
PLATELET # BLD AUTO: 268 K/UL
POTASSIUM SERPL-SCNC: 5.3 MMOL/L
PROT SERPL-MCNC: 7.3 G/DL
PROT UR-MCNC: 61 MG/DL
PROTEIN URINE: 100 MG/DL
RBC # BLD: 2.77 M/UL
RBC # FLD: 14.4 %
SODIUM SERPL-SCNC: 139 MMOL/L
SPECIFIC GRAVITY URINE: 1.01
TIBC SERPL-MCNC: 311 UG/DL
UIBC SERPL-MCNC: 252 UG/DL
UROBILINOGEN URINE: 0.2 MG/DL
WBC # FLD AUTO: 7.8 K/UL

## 2025-05-12 RX ORDER — SODIUM BICARBONATE 650 MG/1
650 TABLET ORAL TWICE DAILY
Qty: 180 | Refills: 3 | Status: ACTIVE | COMMUNITY
Start: 2025-05-12 | End: 1900-01-01

## 2025-05-23 ENCOUNTER — LABORATORY RESULT (OUTPATIENT)
Age: 70
End: 2025-05-23

## 2025-05-23 ENCOUNTER — APPOINTMENT (OUTPATIENT)
Dept: NEPHROLOGY | Facility: CLINIC | Age: 70
End: 2025-05-23
Payer: MEDICARE

## 2025-05-23 VITALS — SYSTOLIC BLOOD PRESSURE: 149 MMHG | DIASTOLIC BLOOD PRESSURE: 62 MMHG

## 2025-05-23 DIAGNOSIS — N18.9 CHRONIC KIDNEY DISEASE, UNSPECIFIED: ICD-10-CM

## 2025-05-23 DIAGNOSIS — I10 ESSENTIAL (PRIMARY) HYPERTENSION: ICD-10-CM

## 2025-05-23 DIAGNOSIS — R80.9 PROTEINURIA, UNSPECIFIED: ICD-10-CM

## 2025-05-23 PROCEDURE — 99213 OFFICE O/P EST LOW 20 MIN: CPT

## 2025-05-27 LAB
ALBUMIN MFR SERPL ELPH: 52.3 %
ALBUMIN SERPL ELPH-MCNC: 4.2 G/DL
ALBUMIN SERPL-MCNC: 3.9 G/DL
ALBUMIN/GLOB SERPL: 1.1 RATIO
ALP BLD-CCNC: 103 U/L
ALPHA1 GLOB MFR SERPL ELPH: 3.8 %
ALPHA1 GLOB SERPL ELPH-MCNC: 0.3 G/DL
ALPHA2 GLOB MFR SERPL ELPH: 12.9 %
ALPHA2 GLOB SERPL ELPH-MCNC: 1 G/DL
ALT SERPL-CCNC: 13 U/L
ANA SER QL IA: NEGATIVE
ANION GAP SERPL CALC-SCNC: 16 MMOL/L
APPEARANCE: CLEAR
AST SERPL-CCNC: 18 U/L
B-GLOBULIN MFR SERPL ELPH: 11.2 %
B-GLOBULIN SERPL ELPH-MCNC: 0.8 G/DL
BASOPHILS # BLD AUTO: 0.03 K/UL
BASOPHILS NFR BLD AUTO: 0.4 %
BILIRUB SERPL-MCNC: <0.2 MG/DL
BILIRUBIN URINE: NEGATIVE
BLOOD URINE: NEGATIVE
BUN SERPL-MCNC: 38 MG/DL
C3 SERPL-MCNC: 126 MG/DL
C4 SERPL-MCNC: 24 MG/DL
CALCIUM SERPL-MCNC: 9.4 MG/DL
CENTROMERE IGG SER-ACNC: <0.2 AL
CHLORIDE SERPL-SCNC: 104 MMOL/L
CHROMATIN AB SERPL-ACNC: <0.2 AL
CO2 SERPL-SCNC: 21 MMOL/L
COLOR: YELLOW
CREAT SERPL-MCNC: 2.43 MG/DL
CREAT SPEC-SCNC: 92 MG/DL
DEPRECATED KAPPA LC FREE/LAMBDA SER: 1.96 RATIO
DSDNA AB SER-ACNC: 1 IU/ML
EGFRCR SERPLBLD CKD-EPI 2021: 28 ML/MIN/1.73M2
ENA JO1 AB SER IA-ACNC: <0.2 AL
ENA RNP AB SER IA-ACNC: <0.2 AL
ENA SCL70 IGG SER IA-ACNC: <0.2 AL
ENA SM AB SER IA-ACNC: <0.2 AL
ENA SS-A AB SER IA-ACNC: <0.2 AL
ENA SS-B AB SER IA-ACNC: <0.2 AL
EOSINOPHIL # BLD AUTO: 0.27 K/UL
EOSINOPHIL NFR BLD AUTO: 3.7 %
GAMMA GLOB FLD ELPH-MCNC: 1.5 G/DL
GAMMA GLOB MFR SERPL ELPH: 19.8 %
GLUCOSE QUALITATIVE U: NEGATIVE MG/DL
GLUCOSE SERPL-MCNC: 102 MG/DL
HBV SURFACE AB SER QL: NONREACTIVE
HBV SURFACE AG SER QL: NONREACTIVE
HCT VFR BLD CALC: 26.9 %
HCV AB SER QL: REACTIVE
HCV S/CO RATIO: 14.31 S/CO
HGB BLD-MCNC: 8.5 G/DL
IGA SERPL-MCNC: 293 MG/DL
IGG SERPL-MCNC: 1561 MG/DL
IGM SERPL-MCNC: 41 MG/DL
IMM GRANULOCYTES NFR BLD AUTO: 0.3 %
INTERPRETATION SERPL IEP-IMP: NORMAL
KAPPA LC CSF-MCNC: 3.76 MG/DL
KAPPA LC SERPL-MCNC: 7.38 MG/DL
KETONES URINE: NEGATIVE MG/DL
LEUKOCYTE ESTERASE URINE: NEGATIVE
LYMPHOCYTES # BLD AUTO: 1.39 K/UL
LYMPHOCYTES NFR BLD AUTO: 18.9 %
M PROTEIN SPEC IFE-MCNC: NORMAL
MAN DIFF?: NORMAL
MCHC RBC-ENTMCNC: 29.2 PG
MCHC RBC-ENTMCNC: 31.6 G/DL
MCV RBC AUTO: 92.4 FL
MICROALBUMIN 24H UR DL<=1MG/L-MCNC: 47.5 MG/DL
MICROALBUMIN/CREAT 24H UR-RTO: 513 MG/G
MONOCYTES # BLD AUTO: 0.79 K/UL
MONOCYTES NFR BLD AUTO: 10.7 %
NEUTROPHILS # BLD AUTO: 4.86 K/UL
NEUTROPHILS NFR BLD AUTO: 66 %
NITRITE URINE: NEGATIVE
PH URINE: 5.5
PLATELET # BLD AUTO: 281 K/UL
POTASSIUM SERPL-SCNC: 5.5 MMOL/L
PROT SERPL-MCNC: 7.4 G/DL
PROTEIN URINE: 100 MG/DL
RBC # BLD: 2.91 M/UL
RBC # FLD: 14.2 %
RIBOSOMAL P AB SER IA-ACNC: <0.2 AL
SODIUM SERPL-SCNC: 142 MMOL/L
SPECIFIC GRAVITY URINE: 1.02
UROBILINOGEN URINE: 0.2 MG/DL
WBC # FLD AUTO: 7.36 K/UL

## 2025-05-30 ENCOUNTER — APPOINTMENT (OUTPATIENT)
Dept: HEMATOLOGY ONCOLOGY | Facility: CLINIC | Age: 70
End: 2025-05-30

## 2025-06-16 ENCOUNTER — APPOINTMENT (OUTPATIENT)
Dept: INFUSION THERAPY | Facility: CLINIC | Age: 70
End: 2025-06-16

## 2025-06-16 ENCOUNTER — OUTPATIENT (OUTPATIENT)
Dept: OUTPATIENT SERVICES | Facility: HOSPITAL | Age: 70
LOS: 1 days | End: 2025-06-16
Payer: MEDICARE

## 2025-06-16 VITALS
SYSTOLIC BLOOD PRESSURE: 134 MMHG | HEART RATE: 59 BPM | DIASTOLIC BLOOD PRESSURE: 64 MMHG | OXYGEN SATURATION: 98 % | TEMPERATURE: 97 F | RESPIRATION RATE: 18 BRPM

## 2025-06-16 VITALS
RESPIRATION RATE: 17 BRPM | HEART RATE: 67 BPM | OXYGEN SATURATION: 97 % | HEIGHT: 64 IN | WEIGHT: 160.06 LBS | TEMPERATURE: 99 F | DIASTOLIC BLOOD PRESSURE: 63 MMHG | SYSTOLIC BLOOD PRESSURE: 139 MMHG

## 2025-06-16 DIAGNOSIS — D50.9 IRON DEFICIENCY ANEMIA, UNSPECIFIED: ICD-10-CM

## 2025-06-16 PROCEDURE — 96374 THER/PROPH/DIAG INJ IV PUSH: CPT

## 2025-06-16 RX ORDER — FERUMOXYTOL 510 MG/17ML
510 INJECTION INTRAVENOUS ONCE
Refills: 0 | Status: COMPLETED | OUTPATIENT
Start: 2025-06-16 | End: 2025-06-16

## 2025-06-16 RX ADMIN — FERUMOXYTOL 468 MILLIGRAM(S): 510 INJECTION INTRAVENOUS at 09:11

## 2025-06-16 RX ADMIN — FERUMOXYTOL 510 MILLIGRAM(S): 510 INJECTION INTRAVENOUS at 09:26

## 2025-06-16 NOTE — DISCHARGE INSTRUCTIONS: CHEMOTHERAPY - NSAPPTSFOLLOWUP_HEME_A_AMB
Anesthesia Evaluation                  Airway   Mallampati: II  TM distance: >3 FB  Neck ROM: full  No difficulty expected  Dental - normal exam   (+) edentulous    Pulmonary - normal exam   (+) pneumonia , shortness of breath,   Cardiovascular - normal exam    (+) hypertension, valvular problems/murmurs MR and TI, CAD, dysrhythmias (LBBB), CHF , pericardial effusion, hyperlipidemia,     ROS comment: ECHO 9/6/22  -Left ventricular ejection fraction appears to be 21 - 25%. Left ventricular systolic function is severely decreased.  -Left ventricular diastolic function is consistent with (grade II w/high LAP) pseudonormalization.  -There is a small (<1cm) pericardial effusion.  -There is a left pleural effusion. There is a right pleural effusion.  -Moderate mitral valve regurgitation is present.  -Moderate to severe tricuspid valve regurgitation is present.  -Estimated right ventricular systolic pressure from tricuspid regurgitation is markedly elevated (>55 mmHg). Calculated right ventricular systolic pressure from tricuspid regurgitation is 56 mmHg.         Neuro/Psych  (+) CVA, headaches, dizziness/light headedness, numbness, psychiatric history Anxiety and Depression, dementia,    GI/Hepatic/Renal/Endo    (+)   thyroid problem hypothyroidism    Musculoskeletal     (+) neck pain,   Abdominal  - normal exam    Bowel sounds: normal.   Substance History      OB/GYN          Other      history of cancer                    Anesthesia Plan    ASA 4     general     (PROPOFOL)  intravenous induction     Anesthetic plan, risks, benefits, and alternatives have been provided, discussed and informed consent has been obtained with: patient.    Plan discussed with CRNA.        CODE STATUS:    Medical Intervention Limits: NO intubation (DNI)  Level Of Support Discussed With: Next of Kin (If No Surrogate); Patient  Code Status (Patient has no pulse and is not breathing): No CPR (Do Not Attempt to Resuscitate)  Medical  Interventions (Patient has pulse or is breathing): Limited Support  Comments: Likely would not wish for feeding tube either       [facility]...

## 2025-06-23 ENCOUNTER — OUTPATIENT (OUTPATIENT)
Dept: OUTPATIENT SERVICES | Facility: HOSPITAL | Age: 70
LOS: 1 days | End: 2025-06-23
Payer: MEDICARE

## 2025-06-23 ENCOUNTER — APPOINTMENT (OUTPATIENT)
Dept: INFUSION THERAPY | Facility: CLINIC | Age: 70
End: 2025-06-23

## 2025-06-23 VITALS
DIASTOLIC BLOOD PRESSURE: 63 MMHG | OXYGEN SATURATION: 97 % | SYSTOLIC BLOOD PRESSURE: 125 MMHG | RESPIRATION RATE: 18 BRPM | HEIGHT: 64 IN | TEMPERATURE: 99 F | HEART RATE: 57 BPM | WEIGHT: 160.94 LBS

## 2025-06-23 DIAGNOSIS — D50.9 IRON DEFICIENCY ANEMIA, UNSPECIFIED: ICD-10-CM

## 2025-06-23 PROCEDURE — 96365 THER/PROPH/DIAG IV INF INIT: CPT

## 2025-06-23 RX ORDER — FERUMOXYTOL 510 MG/17ML
510 INJECTION INTRAVENOUS ONCE
Refills: 0 | Status: COMPLETED | OUTPATIENT
Start: 2025-06-23 | End: 2025-06-23

## 2025-06-23 RX ADMIN — FERUMOXYTOL 510 MILLIGRAM(S): 510 INJECTION INTRAVENOUS at 09:25

## 2025-06-23 RX ADMIN — FERUMOXYTOL 468 MILLIGRAM(S): 510 INJECTION INTRAVENOUS at 09:05

## 2025-06-27 ENCOUNTER — APPOINTMENT (OUTPATIENT)
Dept: HEART AND VASCULAR | Facility: CLINIC | Age: 70
End: 2025-06-27
Payer: MEDICARE

## 2025-06-27 VITALS
BODY MASS INDEX: 25.39 KG/M2 | HEIGHT: 66 IN | SYSTOLIC BLOOD PRESSURE: 158 MMHG | HEART RATE: 70 BPM | DIASTOLIC BLOOD PRESSURE: 76 MMHG | WEIGHT: 158 LBS | TEMPERATURE: 98.5 F | OXYGEN SATURATION: 97 %

## 2025-06-27 PROCEDURE — G2211 COMPLEX E/M VISIT ADD ON: CPT

## 2025-06-27 PROCEDURE — 99214 OFFICE O/P EST MOD 30 MIN: CPT

## 2025-07-15 ENCOUNTER — LABORATORY RESULT (OUTPATIENT)
Age: 70
End: 2025-07-15

## 2025-07-15 ENCOUNTER — APPOINTMENT (OUTPATIENT)
Dept: NEPHROLOGY | Facility: CLINIC | Age: 70
End: 2025-07-15
Payer: MEDICARE

## 2025-07-15 VITALS
BODY MASS INDEX: 25.82 KG/M2 | WEIGHT: 160 LBS | SYSTOLIC BLOOD PRESSURE: 146 MMHG | DIASTOLIC BLOOD PRESSURE: 70 MMHG | HEART RATE: 64 BPM

## 2025-07-15 PROCEDURE — 99214 OFFICE O/P EST MOD 30 MIN: CPT

## 2025-07-16 PROBLEM — E87.5 HYPERKALEMIA: Status: ACTIVE | Noted: 2025-07-16

## 2025-07-16 LAB
ALBUMIN SERPL ELPH-MCNC: 4.2 G/DL
ALBUMIN, RANDOM URINE: 18.1 MG/DL
ALP BLD-CCNC: 102 U/L
ALT SERPL-CCNC: 16 U/L
ANION GAP SERPL CALC-SCNC: 14 MMOL/L
APPEARANCE: CLEAR
AST SERPL-CCNC: 21 U/L
BASOPHILS # BLD AUTO: 0.03 K/UL
BASOPHILS NFR BLD AUTO: 0.4 %
BILIRUB SERPL-MCNC: 0.2 MG/DL
BILIRUBIN URINE: NEGATIVE
BLOOD URINE: NEGATIVE
BUN SERPL-MCNC: 41 MG/DL
CALCIUM SERPL-MCNC: 9 MG/DL
CHLORIDE SERPL-SCNC: 102 MMOL/L
CO2 SERPL-SCNC: 22 MMOL/L
COLOR: YELLOW
CREAT SERPL-MCNC: 2.85 MG/DL
CREAT SPEC-SCNC: 78 MG/DL
CREAT SPEC-SCNC: 78 MG/DL
CREAT/PROT UR: 0.5 RATIO
EGFRCR SERPLBLD CKD-EPI 2021: 23 ML/MIN/1.73M2
EOSINOPHIL # BLD AUTO: 0.25 K/UL
EOSINOPHIL NFR BLD AUTO: 3.4 %
ESTIMATED AVERAGE GLUCOSE: 134 MG/DL
GLUCOSE QUALITATIVE U: 250 MG/DL
GLUCOSE SERPL-MCNC: 126 MG/DL
HBA1C MFR BLD HPLC: 6.3 %
HCT VFR BLD CALC: 26.9 %
HGB BLD-MCNC: 8.5 G/DL
IMM GRANULOCYTES NFR BLD AUTO: 0.4 %
KETONES URINE: NEGATIVE MG/DL
LEUKOCYTE ESTERASE URINE: NEGATIVE
LYMPHOCYTES # BLD AUTO: 1.39 K/UL
LYMPHOCYTES NFR BLD AUTO: 18.8 %
MAN DIFF?: NORMAL
MCHC RBC-ENTMCNC: 28.8 PG
MCHC RBC-ENTMCNC: 31.6 G/DL
MCV RBC AUTO: 91.2 FL
MICROALBUMIN/CREAT 24H UR-RTO: 232 MG/G
MONOCYTES # BLD AUTO: 0.86 K/UL
MONOCYTES NFR BLD AUTO: 11.6 %
NEUTROPHILS # BLD AUTO: 4.85 K/UL
NEUTROPHILS NFR BLD AUTO: 65.4 %
NITRITE URINE: NEGATIVE
PH URINE: 7
PLATELET # BLD AUTO: 218 K/UL
POTASSIUM SERPL-SCNC: 5.9 MMOL/L
PROT SERPL-MCNC: 7.3 G/DL
PROT UR-MCNC: 36 MG/DL
PROTEIN URINE: 30 MG/DL
RBC # BLD: 2.95 M/UL
RBC # FLD: 14.4 %
SODIUM SERPL-SCNC: 139 MMOL/L
SPECIFIC GRAVITY URINE: 1.01
UROBILINOGEN URINE: 0.2 MG/DL
WBC # FLD AUTO: 7.41 K/UL

## 2025-07-16 RX ORDER — SODIUM ZIRCONIUM CYCLOSILICATE 5 G/5G
5 POWDER, FOR SUSPENSION ORAL
Qty: 30 | Refills: 2 | Status: ACTIVE | COMMUNITY
Start: 2025-07-16 | End: 1900-01-01

## 2025-07-17 ENCOUNTER — RESULT REVIEW (OUTPATIENT)
Age: 70
End: 2025-07-17

## 2025-07-20 ENCOUNTER — OUTPATIENT (OUTPATIENT)
Dept: OUTPATIENT SERVICES | Facility: HOSPITAL | Age: 70
LOS: 1 days | End: 2025-07-20

## 2025-08-20 ENCOUNTER — LABORATORY RESULT (OUTPATIENT)
Age: 70
End: 2025-08-20

## 2025-08-20 ENCOUNTER — APPOINTMENT (OUTPATIENT)
Dept: NEPHROLOGY | Facility: CLINIC | Age: 70
End: 2025-08-20
Payer: MEDICARE

## 2025-08-20 VITALS — DIASTOLIC BLOOD PRESSURE: 63 MMHG | SYSTOLIC BLOOD PRESSURE: 128 MMHG

## 2025-08-20 DIAGNOSIS — N18.9 CHRONIC KIDNEY DISEASE, UNSPECIFIED: ICD-10-CM

## 2025-08-20 DIAGNOSIS — I10 ESSENTIAL (PRIMARY) HYPERTENSION: ICD-10-CM

## 2025-08-20 DIAGNOSIS — R80.9 PROTEINURIA, UNSPECIFIED: ICD-10-CM

## 2025-08-20 DIAGNOSIS — E87.5 HYPERKALEMIA: ICD-10-CM

## 2025-08-20 PROCEDURE — 99214 OFFICE O/P EST MOD 30 MIN: CPT

## 2025-08-21 LAB
ALBUMIN SERPL ELPH-MCNC: 4 G/DL
ALBUMIN, RANDOM URINE: 10.7 MG/DL
ALP BLD-CCNC: 100 U/L
ALT SERPL-CCNC: 12 U/L
ANION GAP SERPL CALC-SCNC: 14 MMOL/L
APPEARANCE: CLEAR
AST SERPL-CCNC: 15 U/L
BASOPHILS # BLD AUTO: 0.02 K/UL
BASOPHILS NFR BLD AUTO: 0.3 %
BILIRUB SERPL-MCNC: <0.2 MG/DL
BILIRUBIN URINE: NEGATIVE
BLOOD URINE: NEGATIVE
BUN SERPL-MCNC: 38 MG/DL
CALCIUM SERPL-MCNC: 9.2 MG/DL
CHLORIDE SERPL-SCNC: 103 MMOL/L
CO2 SERPL-SCNC: 22 MMOL/L
COLOR: YELLOW
CREAT SERPL-MCNC: 2.65 MG/DL
CREAT SERPL-MCNC: 2.65 MG/DL
CREAT SPEC-SCNC: 46 MG/DL
CREAT SPEC-SCNC: 46 MG/DL
CREAT/PROT UR: 0.5 RATIO
CYSTATIN C SERPL-MCNC: 2.39 MG/L
EGFRCR SERPLBLD CKD-EPI 2021: 26 ML/MIN/1.73M2
EGFRCR SERPLBLD CKD-EPI 2021: 26 ML/MIN/1.73M2
EGFRCR-CYS SERPLBLD CKD-EPI 2021: 25 ML/MIN/1.73M2
EOSINOPHIL # BLD AUTO: 0.25 K/UL
EOSINOPHIL NFR BLD AUTO: 3.6 %
FERRITIN SERPL-MCNC: 138 NG/ML
GFR/BSA.PRED SERPLBLD CYS-BASED-ARV: 24 ML/MIN/1.73M2
GLUCOSE QUALITATIVE U: 500 MG/DL
GLUCOSE SERPL-MCNC: 147 MG/DL
HCT VFR BLD CALC: 26.5 %
HGB BLD-MCNC: 8.5 G/DL
IMM GRANULOCYTES NFR BLD AUTO: 0.6 %
IRON SATN MFR SERPL: 22 %
IRON SERPL-MCNC: 57 UG/DL
KETONES URINE: NEGATIVE MG/DL
LEUKOCYTE ESTERASE URINE: NEGATIVE
LYMPHOCYTES # BLD AUTO: 1.23 K/UL
LYMPHOCYTES NFR BLD AUTO: 17.9 %
MAGNESIUM SERPL-MCNC: 2.3 MG/DL
MAN DIFF?: NORMAL
MCHC RBC-ENTMCNC: 29.5 PG
MCHC RBC-ENTMCNC: 32.1 G/DL
MCV RBC AUTO: 92 FL
MICROALBUMIN/CREAT 24H UR-RTO: 231 MG/G
MONOCYTES # BLD AUTO: 0.7 K/UL
MONOCYTES NFR BLD AUTO: 10.2 %
NEUTROPHILS # BLD AUTO: 4.65 K/UL
NEUTROPHILS NFR BLD AUTO: 67.4 %
NITRITE URINE: NEGATIVE
PH URINE: 6.5
PHOSPHATE SERPL-MCNC: 4.8 MG/DL
PLATELET # BLD AUTO: 245 K/UL
POTASSIUM SERPL-SCNC: 5.1 MMOL/L
PROT SERPL-MCNC: 7.4 G/DL
PROT UR-MCNC: 24 MG/DL
PROTEIN URINE: 30 MG/DL
RBC # BLD: 2.88 M/UL
RBC # FLD: 14.1 %
SODIUM SERPL-SCNC: 138 MMOL/L
SPECIFIC GRAVITY URINE: 1.01
TIBC SERPL-MCNC: 256 UG/DL
UIBC SERPL-MCNC: 198 UG/DL
UROBILINOGEN URINE: 0.2 MG/DL
WBC # FLD AUTO: 6.89 K/UL

## 2025-09-05 ENCOUNTER — NON-APPOINTMENT (OUTPATIENT)
Age: 70
End: 2025-09-05

## 2025-09-12 ENCOUNTER — APPOINTMENT (OUTPATIENT)
Dept: GASTROENTEROLOGY | Facility: HOSPITAL | Age: 70
End: 2025-09-12

## (undated) DEVICE — FORCEP RADIAL JAW 4 W NDL 2.2MM 2.8MM 240CM ORANGE DISP

## (undated) DEVICE — ELCTR GROUNDING PAD ADULT COVIDIEN